# Patient Record
Sex: FEMALE | Race: WHITE | Employment: UNEMPLOYED | URBAN - METROPOLITAN AREA
[De-identification: names, ages, dates, MRNs, and addresses within clinical notes are randomized per-mention and may not be internally consistent; named-entity substitution may affect disease eponyms.]

---

## 2017-02-23 ENCOUNTER — TELEPHONE (OUTPATIENT)
Dept: FAMILY MEDICINE CLINIC | Age: 52
End: 2017-02-23

## 2017-03-20 DIAGNOSIS — F90.9 ATTENTION DEFICIT HYPERACTIVITY DISORDER (ADHD), UNSPECIFIED ADHD TYPE: ICD-10-CM

## 2017-03-20 RX ORDER — DEXTROAMPHETAMINE SACCHARATE, AMPHETAMINE ASPARTATE, DEXTROAMPHETAMINE SULFATE AND AMPHETAMINE SULFATE 2.5; 2.5; 2.5; 2.5 MG/1; MG/1; MG/1; MG/1
10 TABLET ORAL DAILY
Qty: 30 TABLET | Refills: 0 | Status: SHIPPED | OUTPATIENT
Start: 2017-03-20 | End: 2017-04-27 | Stop reason: SDUPTHER

## 2017-03-22 ENCOUNTER — TELEPHONE (OUTPATIENT)
Dept: FAMILY MEDICINE CLINIC | Age: 52
End: 2017-03-22

## 2017-03-27 ENCOUNTER — OFFICE VISIT (OUTPATIENT)
Dept: FAMILY MEDICINE CLINIC | Age: 52
End: 2017-03-27

## 2017-03-27 VITALS
SYSTOLIC BLOOD PRESSURE: 106 MMHG | WEIGHT: 139 LBS | HEART RATE: 60 BPM | OXYGEN SATURATION: 96 % | BODY MASS INDEX: 24.43 KG/M2 | DIASTOLIC BLOOD PRESSURE: 64 MMHG | TEMPERATURE: 97.7 F

## 2017-03-27 DIAGNOSIS — F41.9 ANXIETY: ICD-10-CM

## 2017-03-27 DIAGNOSIS — R07.9 CHEST PAIN, UNSPECIFIED TYPE: Primary | ICD-10-CM

## 2017-03-27 PROCEDURE — 99213 OFFICE O/P EST LOW 20 MIN: CPT | Performed by: FAMILY MEDICINE

## 2017-03-27 PROCEDURE — 93000 ELECTROCARDIOGRAM COMPLETE: CPT | Performed by: FAMILY MEDICINE

## 2017-03-27 RX ORDER — LORAZEPAM 0.5 MG/1
0.5 TABLET ORAL 2 TIMES DAILY PRN
Qty: 15 TABLET | Refills: 0 | Status: SHIPPED | OUTPATIENT
Start: 2017-03-27 | End: 2017-08-07 | Stop reason: SDUPTHER

## 2017-04-27 DIAGNOSIS — F90.9 ATTENTION DEFICIT HYPERACTIVITY DISORDER (ADHD), UNSPECIFIED ADHD TYPE: ICD-10-CM

## 2017-04-27 RX ORDER — DEXTROAMPHETAMINE SACCHARATE, AMPHETAMINE ASPARTATE, DEXTROAMPHETAMINE SULFATE AND AMPHETAMINE SULFATE 2.5; 2.5; 2.5; 2.5 MG/1; MG/1; MG/1; MG/1
10 TABLET ORAL DAILY
Qty: 30 TABLET | Refills: 0 | Status: SHIPPED | OUTPATIENT
Start: 2017-04-27 | End: 2017-08-07 | Stop reason: SDUPTHER

## 2017-05-02 ENCOUNTER — TELEPHONE (OUTPATIENT)
Dept: FAMILY MEDICINE CLINIC | Age: 52
End: 2017-05-02

## 2017-06-08 ENCOUNTER — HOSPITAL ENCOUNTER (OUTPATIENT)
Dept: CARDIOLOGY | Facility: CLINIC | Age: 52
Discharge: HOME | End: 2017-06-08
Attending: FAMILY MEDICINE | Admitting: FAMILY MEDICINE

## 2017-06-08 LAB
LV EF: 64 %
LVEF MODALITY: NORMAL

## 2017-06-28 ENCOUNTER — OFFICE VISIT (OUTPATIENT)
Dept: FAMILY MEDICINE CLINIC | Age: 52
End: 2017-06-28

## 2017-06-28 VITALS
OXYGEN SATURATION: 99 % | HEART RATE: 83 BPM | RESPIRATION RATE: 16 BRPM | DIASTOLIC BLOOD PRESSURE: 70 MMHG | BODY MASS INDEX: 24.43 KG/M2 | WEIGHT: 139 LBS | SYSTOLIC BLOOD PRESSURE: 120 MMHG

## 2017-06-28 DIAGNOSIS — R05.9 COUGH: ICD-10-CM

## 2017-06-28 DIAGNOSIS — R07.89 ATYPICAL CHEST PAIN: Primary | ICD-10-CM

## 2017-06-28 PROCEDURE — 99214 OFFICE O/P EST MOD 30 MIN: CPT | Performed by: FAMILY MEDICINE

## 2017-06-28 RX ORDER — LEVALBUTEROL TARTRATE 45 UG/1
1-2 AEROSOL, METERED ORAL EVERY 4 HOURS PRN
Qty: 1 INHALER | Refills: 3 | Status: SHIPPED | OUTPATIENT
Start: 2017-06-28 | End: 2019-05-06 | Stop reason: ALTCHOICE

## 2017-07-07 ENCOUNTER — TELEPHONE (OUTPATIENT)
Dept: FAMILY MEDICINE CLINIC | Age: 52
End: 2017-07-07

## 2017-07-25 ENCOUNTER — TELEPHONE (OUTPATIENT)
Dept: FAMILY MEDICINE CLINIC | Age: 52
End: 2017-07-25

## 2017-08-07 ENCOUNTER — TELEPHONE (OUTPATIENT)
Dept: FAMILY MEDICINE CLINIC | Age: 52
End: 2017-08-07

## 2017-08-07 ENCOUNTER — OFFICE VISIT (OUTPATIENT)
Dept: FAMILY MEDICINE CLINIC | Age: 52
End: 2017-08-07

## 2017-08-07 VITALS
RESPIRATION RATE: 16 BRPM | SYSTOLIC BLOOD PRESSURE: 104 MMHG | BODY MASS INDEX: 24.27 KG/M2 | OXYGEN SATURATION: 99 % | WEIGHT: 137 LBS | HEART RATE: 71 BPM | HEIGHT: 63 IN | DIASTOLIC BLOOD PRESSURE: 60 MMHG

## 2017-08-07 DIAGNOSIS — Z83.2 FAMILY HISTORY OF CLOTTING DISORDER: ICD-10-CM

## 2017-08-07 DIAGNOSIS — F90.9 ATTENTION DEFICIT HYPERACTIVITY DISORDER (ADHD), UNSPECIFIED ADHD TYPE: ICD-10-CM

## 2017-08-07 DIAGNOSIS — E55.9 VITAMIN D DEFICIENCY: ICD-10-CM

## 2017-08-07 DIAGNOSIS — F41.9 ANXIETY: ICD-10-CM

## 2017-08-07 DIAGNOSIS — Z00.00 ANNUAL PHYSICAL EXAM: Primary | ICD-10-CM

## 2017-08-07 LAB
A/G RATIO: 2.2 (ref 1.1–2.2)
ALBUMIN SERPL-MCNC: 5.2 G/DL (ref 3.4–5)
ALP BLD-CCNC: 57 U/L (ref 40–129)
ALT SERPL-CCNC: 18 U/L (ref 10–40)
ANION GAP SERPL CALCULATED.3IONS-SCNC: 17 MMOL/L (ref 3–16)
AST SERPL-CCNC: 20 U/L (ref 15–37)
BILIRUB SERPL-MCNC: 0.5 MG/DL (ref 0–1)
BUN BLDV-MCNC: 15 MG/DL (ref 7–20)
CALCIUM SERPL-MCNC: 9.6 MG/DL (ref 8.3–10.6)
CHLORIDE BLD-SCNC: 98 MMOL/L (ref 99–110)
CHOLESTEROL, TOTAL: 231 MG/DL (ref 0–199)
CO2: 24 MMOL/L (ref 21–32)
CREAT SERPL-MCNC: 0.6 MG/DL (ref 0.6–1.1)
GFR AFRICAN AMERICAN: >60
GFR NON-AFRICAN AMERICAN: >60
GLOBULIN: 2.4 G/DL
GLUCOSE BLD-MCNC: 94 MG/DL (ref 70–99)
HCT VFR BLD CALC: 40 % (ref 36–48)
HDLC SERPL-MCNC: 82 MG/DL (ref 40–60)
HEMOGLOBIN: 13.5 G/DL (ref 12–16)
LDL CHOLESTEROL CALCULATED: 127 MG/DL
MCH RBC QN AUTO: 30 PG (ref 26–34)
MCHC RBC AUTO-ENTMCNC: 33.7 G/DL (ref 31–36)
MCV RBC AUTO: 89.2 FL (ref 80–100)
PDW BLD-RTO: 13.2 % (ref 12.4–15.4)
PLATELET # BLD: 264 K/UL (ref 135–450)
PMV BLD AUTO: 9.4 FL (ref 5–10.5)
POTASSIUM SERPL-SCNC: 4.2 MMOL/L (ref 3.5–5.1)
RBC # BLD: 4.49 M/UL (ref 4–5.2)
SODIUM BLD-SCNC: 139 MMOL/L (ref 136–145)
TOTAL PROTEIN: 7.6 G/DL (ref 6.4–8.2)
TRIGL SERPL-MCNC: 109 MG/DL (ref 0–150)
TSH REFLEX FT4: 0.9 UIU/ML (ref 0.27–4.2)
VITAMIN D 25-HYDROXY: 23.1 NG/ML
VLDLC SERPL CALC-MCNC: 22 MG/DL
WBC # BLD: 3.7 K/UL (ref 4–11)

## 2017-08-07 PROCEDURE — 99396 PREV VISIT EST AGE 40-64: CPT | Performed by: FAMILY MEDICINE

## 2017-08-07 RX ORDER — DEXTROAMPHETAMINE SACCHARATE, AMPHETAMINE ASPARTATE, DEXTROAMPHETAMINE SULFATE AND AMPHETAMINE SULFATE 2.5; 2.5; 2.5; 2.5 MG/1; MG/1; MG/1; MG/1
10 TABLET ORAL DAILY
Qty: 90 TABLET | Refills: 0 | Status: SHIPPED | OUTPATIENT
Start: 2017-08-07 | End: 2017-12-13 | Stop reason: SDUPTHER

## 2017-08-07 RX ORDER — LORAZEPAM 0.5 MG/1
0.5 TABLET ORAL 2 TIMES DAILY PRN
Qty: 15 TABLET | Refills: 0 | Status: SHIPPED | OUTPATIENT
Start: 2017-08-07 | End: 2017-12-04 | Stop reason: SDUPTHER

## 2017-08-10 LAB
ACTIVATED PROTEIN C RESISTANCE: 3.79
APTT: 32 SEC (ref 24–35)
FACTOR V LEIDEN: NORMAL
FACTOR VIII ACTIVITY: 100 % (ref 56–191)
FACV SPECIMEN: NORMAL
HOMOCYSTEINE: 9 UMOL/L
PROTHROMBIN G20210A MUTATION: NEGATIVE
PT PCR SPECIMEN: NORMAL
THROMBOSIS INTERPRETATION: NORMAL

## 2017-08-11 ENCOUNTER — TELEPHONE (OUTPATIENT)
Dept: FAMILY MEDICINE CLINIC | Age: 52
End: 2017-08-11

## 2017-08-11 DIAGNOSIS — Z83.2 FAMILY HISTORY OF HYPERCOAGULABILITY: Primary | ICD-10-CM

## 2017-08-30 ENCOUNTER — HOSPITAL ENCOUNTER (OUTPATIENT)
Dept: OTHER | Age: 52
Discharge: HOME | End: 2017-08-30
Attending: FAMILY MEDICINE | Admitting: FAMILY MEDICINE

## 2017-08-30 DIAGNOSIS — Z83.2 FAMILY HISTORY OF HYPERCOAGULABILITY: ICD-10-CM

## 2017-08-31 ENCOUNTER — OFFICE VISIT (OUTPATIENT)
Dept: CARDIOLOGY CLINIC | Age: 52
End: 2017-08-31

## 2017-08-31 VITALS
SYSTOLIC BLOOD PRESSURE: 128 MMHG | DIASTOLIC BLOOD PRESSURE: 82 MMHG | BODY MASS INDEX: 24.1 KG/M2 | HEIGHT: 63 IN | WEIGHT: 136 LBS | HEART RATE: 66 BPM

## 2017-08-31 DIAGNOSIS — R94.39 ABNORMAL STRESS ELECTROCARDIOGRAM TEST: ICD-10-CM

## 2017-08-31 DIAGNOSIS — R07.9 CHEST PAIN, UNSPECIFIED TYPE: ICD-10-CM

## 2017-08-31 PROCEDURE — 99215 OFFICE O/P EST HI 40 MIN: CPT | Performed by: INTERNAL MEDICINE

## 2017-08-31 RX ORDER — METOPROLOL TARTRATE 50 MG/1
50 TABLET, FILM COATED ORAL ONCE
Qty: 1 TABLET | Refills: 0 | Status: SHIPPED | OUTPATIENT
Start: 2017-08-31 | End: 2019-05-06

## 2017-09-01 LAB
PROTEIN S ANTIGEN, FREE: 77 % (ref 55–123)
PROTEIN S ANTIGEN, TOTAL: 108 % (ref 63–126)

## 2017-09-05 ENCOUNTER — TELEPHONE (OUTPATIENT)
Dept: CARDIOLOGY CLINIC | Age: 52
End: 2017-09-05

## 2017-09-11 ENCOUNTER — TELEPHONE (OUTPATIENT)
Dept: CARDIOLOGY CLINIC | Age: 52
End: 2017-09-11

## 2017-12-04 DIAGNOSIS — F41.9 ANXIETY: ICD-10-CM

## 2017-12-04 DIAGNOSIS — F90.9 ATTENTION DEFICIT HYPERACTIVITY DISORDER (ADHD), UNSPECIFIED ADHD TYPE: ICD-10-CM

## 2017-12-04 RX ORDER — LORAZEPAM 0.5 MG/1
0.5 TABLET ORAL 2 TIMES DAILY PRN
Qty: 15 TABLET | Refills: 0 | Status: SHIPPED | OUTPATIENT
Start: 2017-12-04 | End: 2018-03-21 | Stop reason: SDUPTHER

## 2017-12-04 NOTE — TELEPHONE ENCOUNTER
Patient called and is requesting a refill on her Lorazepam. Please call patient when rx is sent to [facility] on [facility]. Thanks.

## 2017-12-13 DIAGNOSIS — F90.9 ATTENTION DEFICIT HYPERACTIVITY DISORDER (ADHD), UNSPECIFIED ADHD TYPE: ICD-10-CM

## 2017-12-13 RX ORDER — DEXTROAMPHETAMINE SACCHARATE, AMPHETAMINE ASPARTATE, DEXTROAMPHETAMINE SULFATE AND AMPHETAMINE SULFATE 2.5; 2.5; 2.5; 2.5 MG/1; MG/1; MG/1; MG/1
10 TABLET ORAL DAILY
Qty: 90 TABLET | Refills: 0 | Status: SHIPPED | OUTPATIENT
Start: 2017-12-13 | End: 2018-03-21 | Stop reason: SDUPTHER

## 2017-12-13 NOTE — TELEPHONE ENCOUNTER
Last Seen: 08.07.17  Last Filled: 08/07/17  Last UDS: 09.15.16  OARRS Run On: 12.06.17  Med Agreement Signed On:09.20.16  Next Appointment: none

## 2018-01-31 ENCOUNTER — OFFICE VISIT (OUTPATIENT)
Dept: ORTHOPEDIC SURGERY | Age: 53
End: 2018-01-31

## 2018-01-31 VITALS
BODY MASS INDEX: 24.1 KG/M2 | DIASTOLIC BLOOD PRESSURE: 68 MMHG | HEIGHT: 63 IN | SYSTOLIC BLOOD PRESSURE: 121 MMHG | WEIGHT: 136.02 LBS | HEART RATE: 59 BPM

## 2018-01-31 DIAGNOSIS — S46.012A ROTATOR CUFF STRAIN, LEFT, INITIAL ENCOUNTER: ICD-10-CM

## 2018-01-31 DIAGNOSIS — M25.512 LEFT SHOULDER PAIN, UNSPECIFIED CHRONICITY: Primary | ICD-10-CM

## 2018-01-31 PROCEDURE — G8484 FLU IMMUNIZE NO ADMIN: HCPCS | Performed by: FAMILY MEDICINE

## 2018-01-31 PROCEDURE — G8427 DOCREV CUR MEDS BY ELIG CLIN: HCPCS | Performed by: FAMILY MEDICINE

## 2018-01-31 PROCEDURE — 99214 OFFICE O/P EST MOD 30 MIN: CPT | Performed by: FAMILY MEDICINE

## 2018-01-31 PROCEDURE — 3014F SCREEN MAMMO DOC REV: CPT | Performed by: FAMILY MEDICINE

## 2018-01-31 PROCEDURE — 1036F TOBACCO NON-USER: CPT | Performed by: FAMILY MEDICINE

## 2018-01-31 PROCEDURE — 3017F COLORECTAL CA SCREEN DOC REV: CPT | Performed by: FAMILY MEDICINE

## 2018-01-31 PROCEDURE — G8420 CALC BMI NORM PARAMETERS: HCPCS | Performed by: FAMILY MEDICINE

## 2018-01-31 RX ORDER — IBUPROFEN 600 MG/1
600 TABLET ORAL EVERY 6 HOURS PRN
Qty: 120 TABLET | Refills: 3 | Status: SHIPPED | OUTPATIENT
Start: 2018-01-31 | End: 2019-05-06 | Stop reason: ALTCHOICE

## 2018-01-31 RX ORDER — METHYLPREDNISOLONE 4 MG/1
TABLET ORAL
Qty: 21 KIT | Refills: 0 | Status: SHIPPED | OUTPATIENT
Start: 2018-01-31 | End: 2018-04-25

## 2018-01-31 NOTE — PROGRESS NOTES
Chief Complaint    Shoulder Pain (OPSP LEFT SHOULDER)    Initial consultation recurrent left shoulder pain with mild weakness    History of Present Illness: Brandy Lozoya is a 47 y.o. 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 Her symptoms began after she was working out with a substitute  who was attempting to get her to do pull-ups and was hanging from a bar on 12/20/2017 when she noticed a mild straining laterally involving her left shoulder. There is no definitive pop or crack. Her past orthopedic history is remarkable for a previously documented partial thickness undersurface tear to the supraspinatus which was documented on MRI back in 2014 which was successfully rehabilitated. She did have some capsulitis notable at that time as well. She did not have a great deal of pain the day of her work on on 12/20/2017 with the following day she began notice the soreness which has not improved which is causing her some concern. She states that she will have fleeting pain with reaching away in an abducted position or with repetitive overhead activity but is not consistent pain but she does have some weakness. She describes her pain symptoms as sharp at 6-7 out of 10 with sudden abduction but then rapidly diminished after about 20-30 seconds. She has been having to modify her work up. Reports no locking catching or instability symptoms. No neck pain or radicular symptoms. She has been attempting to do her stretching and exercise program this definitely been modifying her workouts. No night pain. She is seen today for orthopedic and sports consultation with imaging.       Medical History  Patient's medications, allergies, past medical, surgical, social and family histories were reviewed and updated as appropriate. Review of Systems  Relevant review of systems reviewed on 1/31/2018 and available in the patient's chart under the medial tab. Vital Signs  Vitals:    01/31/18 0948   BP: 121/68   Pulse: 59         General Exam:   Constitutional: Patient is adequately groomed with no evidence of malnutrition  DTRs: Deep tendon reflexes are intact  Mental Status: The patient is oriented to time, place and person. The patient's mood and affect are appropriate. Lymphatic: The lymphatic examination bilaterally reveals all areas to be without enlargement or induration. Vascular: Examination reveals no swelling or calf tenderness. Peripheral pulses are palpable and 2+. Neurological: The patient has good coordination. There is no weakness or sensory deficit. Shoulder Examination    Inspection:  There is no high-grade deformity atrophy or effusion. Palpation:  She does have mild tenderness over the greater tuberosity minimally over the posterior cuff. Minimal tenderness over the a.c. joint. No bicipital tendon tenderness. Rang of Motion:  She does have near full range of motion about the shoulder but does have some discomfort over the greater tuberosity with abduction beyond 150 and forward flexion beyond 155-160. Some pulling with extreme internal and external rotation bilaterally. Strength:  She does have some mild weakness at 4-4+ out of 5 with supra and infraspinatus testing. 5 out of 5 subscap testing. Special Tests:  Negative drop and liftoff testing. Minimal discomfort with impingement testing. She does have some pain reproduced with supraspinatus testing. Negative speed's and labral testing. No instability. Negative apprehension testing. Negative screening cervical testing. Skin: There are no rashes, ulcerations or lesions. Distal motor sensory and vascular exam is intact. Gait: Fluid smooth gait.     Reflexes:

## 2018-02-01 ENCOUNTER — HOSPITAL ENCOUNTER (OUTPATIENT)
Dept: PHYSICAL THERAPY | Age: 53
Discharge: HOME | End: 2018-02-28
Attending: FAMILY MEDICINE | Admitting: FAMILY MEDICINE

## 2018-02-01 ENCOUNTER — HOSPITAL ENCOUNTER (OUTPATIENT)
Dept: PHYSICAL THERAPY | Age: 53
Discharge: HOME | End: 2018-01-31
Admitting: FAMILY MEDICINE

## 2018-02-06 ENCOUNTER — HOSPITAL ENCOUNTER (OUTPATIENT)
Dept: PHYSICAL THERAPY | Age: 53
Discharge: HOME | End: 2018-02-07
Admitting: FAMILY MEDICINE

## 2018-02-06 NOTE — PLAN OF CARE
[facility], [address]  Phone: [phone]  Fax [phone]     Physical Therapy Certification    Dear Referring Practitioner: Dr. Norman Burgess,    We had the pleasure of evaluating the following patient for physical therapy services at [address] [place] [address]. A summary of our findings can be found in the initial assessment below. This includes our plan of care. If you have any questions or concerns regarding these findings, please do not hesitate to contact me at the office phone number checked above. Thank you for the referral.       Physician Signature:_______________________________Date:__________________  By signing above (or electronic signature), therapists plan is approved by physician    Patient: Ani Wofle   : 1965   MRN: [id number]  Referring Physician: Referring Practitioner: Dr. Norman Burgess      Evaluation Date: 2018      Medical Diagnosis Information:  Diagnosis: L shoulder pain/ RTC strain M25.512,S46.012D   Treatment Diagnosis: L shoulder pain                                          Insurance information: PT Insurance Information:  60 visits/yr    Precautions/ Contra-indications: migraines,prev Lshoulder injury   Latex Allergy:  [x]NO      []YES  Preferred Language for Healthcare:   [x]English       []other:    SUBJECTIVE: Patient reports she was working out with a  in 2017 and was hanging from a bar several times. Pt reports the next day she had increased L shoulder pain. She has a hx of a L RTC tear from . She had PT and a cortizone injectionx2 and it got better. Pt reports she saw MD and is now on prednisone. Pt reports she has no pain at rest but with certain mvmts.       Relevant Medical History: hx of RTC tear   Functional Disability Index:PT G-Codes  Functional Assessment Tool Used: quick dash   Score: 5%  Functional Limitation: Carrying, and observation. Intake form has been scanned into medical record. Patient has been instructed to contact their primary care physician regarding ROS issues if not already being addressed at this time. Co-morbidities/Complexities (which will affect course of rehabilitation):   [x]None           Arthritic conditions   []Rheumatoid arthritis (M05.9)  []Osteoarthritis (M19.91)   Cardiovascular conditions   []Hypertension (I10)  []Hyperlipidemia (E78.5)  []Angina pectoris (I20)  []Atherosclerosis (I70)   Musculoskeletal conditions   []Disc pathology   []Congenital spine pathologies   []Prior surgical intervention  []Osteoporosis (M81.8)  []Osteopenia (M85.8)   Endocrine conditions   []Hypothyroid (E03.9)  []Hyperthyroid Gastrointestinal conditions   []Constipation (G48.84)   Metabolic conditions   []Morbid obesity (E66.01)  []Diabetes type 1(E10.65) or 2 (E11.65)   []Neuropathy (G60.9)     Pulmonary conditions   []Asthma (J45)  []Coughing   []COPD (J44.9)   Psychological Disorders  []Anxiety (F41.9)  []Depression (F32.9)   []Other:   []Other:          Barriers to/and or personal factors that will affect rehab potential:              []Age  []Sex              []Motivation/Lack of Motivation                        []Co-Morbidities              []Cognitive Function, education/learning barriers              []Environmental, home barriers              []profession/work barriers  []past PT/medical experience  []other:  Justification:      Falls Risk Assessment (30 days):   [x] Falls Risk assessed and no intervention required. [] Falls Risk assessed and Patient requires intervention due to being higher risk   TUG score (>12s at risk):     [] Falls education provided, including       G-Codes:  PT G-Codes  Functional Assessment Tool Used: quick dash   Score: 5%  Functional Limitation: Carrying, moving and handling objects  Carrying, Moving and Handling Objects Current Status ():  At least 1 percent but less than 20

## 2018-02-20 ENCOUNTER — HOSPITAL ENCOUNTER (OUTPATIENT)
Dept: PHYSICAL THERAPY | Age: 53
Discharge: HOME | End: 2018-02-21
Admitting: FAMILY MEDICINE

## 2018-02-28 ENCOUNTER — OFFICE VISIT (OUTPATIENT)
Dept: ORTHOPEDIC SURGERY | Age: 53
End: 2018-02-28

## 2018-02-28 ENCOUNTER — HOSPITAL ENCOUNTER (OUTPATIENT)
Dept: PHYSICAL THERAPY | Age: 53
Discharge: HOME | End: 2018-03-01
Admitting: FAMILY MEDICINE

## 2018-02-28 VITALS
WEIGHT: 136.02 LBS | SYSTOLIC BLOOD PRESSURE: 108 MMHG | HEIGHT: 63 IN | HEART RATE: 79 BPM | DIASTOLIC BLOOD PRESSURE: 66 MMHG | BODY MASS INDEX: 24.1 KG/M2

## 2018-02-28 DIAGNOSIS — M25.512 CHRONIC LEFT SHOULDER PAIN: ICD-10-CM

## 2018-02-28 DIAGNOSIS — G89.29 CHRONIC LEFT SHOULDER PAIN: ICD-10-CM

## 2018-02-28 DIAGNOSIS — S46.012D STRAIN OF LEFT ROTATOR CUFF CAPSULE, SUBSEQUENT ENCOUNTER: Primary | ICD-10-CM

## 2018-02-28 PROCEDURE — 99213 OFFICE O/P EST LOW 20 MIN: CPT | Performed by: FAMILY MEDICINE

## 2018-02-28 PROCEDURE — G8484 FLU IMMUNIZE NO ADMIN: HCPCS | Performed by: FAMILY MEDICINE

## 2018-02-28 PROCEDURE — 20610 DRAIN/INJ JOINT/BURSA W/O US: CPT | Performed by: FAMILY MEDICINE

## 2018-02-28 PROCEDURE — G8420 CALC BMI NORM PARAMETERS: HCPCS | Performed by: FAMILY MEDICINE

## 2018-02-28 PROCEDURE — 3014F SCREEN MAMMO DOC REV: CPT | Performed by: FAMILY MEDICINE

## 2018-02-28 PROCEDURE — 1036F TOBACCO NON-USER: CPT | Performed by: FAMILY MEDICINE

## 2018-02-28 PROCEDURE — G8427 DOCREV CUR MEDS BY ELIG CLIN: HCPCS | Performed by: FAMILY MEDICINE

## 2018-02-28 PROCEDURE — 3017F COLORECTAL CA SCREEN DOC REV: CPT | Performed by: FAMILY MEDICINE

## 2018-02-28 RX ORDER — NABUMETONE 750 MG/1
750 TABLET, FILM COATED ORAL 2 TIMES DAILY
Qty: 60 TABLET | Refills: 3 | Status: SHIPPED | OUTPATIENT
Start: 2018-02-28 | End: 2019-05-06

## 2018-02-28 NOTE — FLOWSHEET NOTE
[facility], [address] [place]  Phone: [phone]  Fax [phone]      Physical Therapy Daily Treatment Note  Date:  2018    Patient Name:  Jose A Grant    :  1965  MRN: [id number]  Restrictions/Precautions:    Medical/Treatment Diagnosis Information:  Diagnosis: L shoulder pain/ RTC strain M25.512,S46.012D  Treatment Diagnosis: L shoulder pain   Insurance/Certification information:  PT Insurance Information:  60 visits/yr  Physician Information:  Referring Practitioner: Dr. Dinh Galaviz of care signed (Y/N): sent;POC expires 3/6    Date of Patient follow up with Physician: 18    G-Code (if applicable):      Date G-Code Applied:  Quick dash 5% 18       Progress Note: []  Yes  [x]  No  Next due by: Visit #10      Latex Allergy:  [x]NO      []YES  Preferred Language for Healthcare:   [x]English       []other:    Visit # Insurance Allowable Requires auth   4 60    [x]no        []yes:     Pain level: 0-5/10 L shoulder      SUBJECTIVE: pt reports she is no longer having shooting pains. But still has pain with shoulder behind and pushing up on arm. Pt reports she just saw MD and had a cortizone injection. Pt reports she has returned to working out but not doing as much weight as she had been doing     OBJECTIVE:   Observation:   Test measurements:      RESTRICTIONS/PRECAUTIONS:  MRI from ;   Partial thickness, undersurface tear of the anterior aspect of the supraspinatus footprint    measuring 6mm x 4mm in dimension. 2. Anterior capsular thickening of the glenohumeral joint consistent with dry capsulitis.          Exercises/Interventions:   Therapeutic Ex/ NMR  Sets/rep comments   Prone abd  2# 2x10 hep   Prone LT active  3x10 Hep    Prone ER 3x10    TB shoulder IR/ER 90-90  GTB 3x10  Hep 18   TB LT  BTB 3x10  hep   Prone shoulder flex  0# 2x10     Prone ext with TB  GTB 2x10     Sleeper stretch 10\"x10  Hep 2/20/18    Standing tricep ext with TB  GTB 3x10     Standing shoulder flex against wall with lift off and TB for scap retraction  OTB3\" 2x10     triped RS with R UE lift /oscillation with therabar     Supine tricep ext  3# 3x10    SL shoulder ER  2# 3x10     LT iso with table  6\"2x10     Standing shoulder ext with TB  GTB 2x10                              Manual Intervention     STM to L supraspinatus  7 min     Post glide/PROM flexion 6'    RS in triped :                        NMR re-education                                                 Therapeutic Exercise and NMR EXR  [] (16045) Provided verbal/tactile cueing for activities related to strengthening, flexibility, endurance, ROM  for improvements in scapular, scapulothoracic and UE control with self care, reaching, carrying, lifting, house/yardwork, driving/computer work.    [] (72741) Provided verbal/tactile cueing for activities related to improving balance, coordination, kinesthetic sense, posture, motor skill, proprioception  to assist with  scapular, scapulothoracic and UE control with self care, reaching, carrying, lifting, house/yardwork, driving/computer work. Therapeutic Activities:    [] (39750 or 83084) Provided verbal/tactile cueing for activities related to improving balance, coordination, kinesthetic sense, posture, motor skill, proprioception and motor activation to allow for proper function of scapular, scapulothoracic and UE control with self care, carrying, lifting, driving/computer work.      Home Exercise Program:    [x] (96317) Reviewed/Progressed HEP activities related to strengthening, flexibility, endurance, ROM of scapular, scapulothoracic and UE control with self care, reaching, carrying, lifting, house/yardwork, driving/computer work  [] (20259) Reviewed/Progressed HEP activities related to improving balance, coordination, kinesthetic sense, posture, motor skill, proprioception of scapular, scapulothoracic and functional goals listed. [] Progression is slowed due to complexities listed. [] Progression has been slowed due to co-morbidities. [x] Plan just implemented, too soon to assess goals progression  [] Other:     ASSESSMENT: . Decreasing frequency of pain, still tight shoulder IR at 90°.  Improving scap stab     Treatment/Activity Tolerance:  [x] Patient tolerated treatment well [] Patient limited by fatique  [] Patient limited by pain  [] Patient limited by other medical complications  [] Other:     Prognosis: [x] Good [] Fair  [] Poor    Patient Requires Follow-up: [x] Yes  [] No    PLAN:   [x] Continue per plan of care [] Alter current plan (see comments)  [] Plan of care initiated [] Hold pending MD visit [] Discharge    Electronically signed by: [place] [id number]

## 2018-03-01 ENCOUNTER — HOSPITAL ENCOUNTER (OUTPATIENT)
Dept: PHYSICAL THERAPY | Age: 53
Discharge: HOME | End: 2018-03-31
Attending: FAMILY MEDICINE | Admitting: FAMILY MEDICINE

## 2018-03-01 NOTE — PROGRESS NOTES
Ongoing discomfort over the greater tuberosity with abduction beyond 150 and forward flexion beyond 155-160. Some pulling with extreme internal and external rotation bilaterally. Strength:  She does have some mild weakness at 4-4+ out of 5 with supra and infraspinatus testing. 5 out of 5 subscap testing. Special Tests:  Negative drop and liftoff testing. Minimal discomfort with impingement testing. She rates this at 3-4/10. She does have less pain reproduced with supraspinatus testing. Negative speed's and labral testing. No instability. Negative apprehension testing. Negative screening cervical testing. Skin: There are no rashes, ulcerations or lesions. Distal motor sensory and vascular exam is intact. Gait: Fluid smooth gait. Reflexes:  Symmetrically preserved. Additional Comments:        Additional Examinations:  Contralateral Exam: Examination of the right shoulder reveals no atrophy or deformity. The skin is warm and dry. Range of motion is within normal limits. There is no focal tenderness with palpation. No AC joint tenderness. Negative Neer's and Rose-Nate exams. Strength is graded 5/5 throughout. Right Upper Extremity:  Examination of the right upper extremity does not show any tenderness, deformity or injury. Range of motion is unremarkable. There is no gross instability. There are no rashes, ulcerations or lesions. Strength and tone are normal.  Left Upper Extremity: Examination of the left upper extremity does not show any tenderness, deformity or injury. Range of motion is unremarkable. There is no gross instability. There are no rashes, ulcerations or lesions. Strength and tone are normal.         Diagnostic Test Findings:            Assessment:  #1.   Nearly 10 weeks status post moderately improved recurrent left shoulder rotator cuff strain with shoulder pain with previously documented partial thickness undersurface tear supraspinatus

## 2018-03-08 ENCOUNTER — HOSPITAL ENCOUNTER (OUTPATIENT)
Dept: PHYSICAL THERAPY | Age: 53
Discharge: HOME | End: 2018-03-09
Admitting: FAMILY MEDICINE

## 2018-03-08 NOTE — FLOWSHEET NOTE
[facility],  [address] [place]  Phone: [phone]  Fax [phone]    ATHLETIC TRAINING [address] [facility]  Date:  3/8/2018    Patient Name:  Jack Haro    :  1965  MRN: [id number]  Restrictions/Precautions:  MRI from   Partial thickness, undersurface tear of the anterior aspect of the supraspinatus footprint    measuring 6mm x 4mm in dimension. 2. Anterior capsular thickening of the glenohumeral joint consistent with dry capsulitis. Medical/Treatment Diagnosis Information:  ·  L shoulder pain/ RTC strain  ·  L shoulder pain  Physician Information:   Dr. Sindi Fernando Post-op  2wks    4 wks 6 wks 8 wks   3wks 6 wks 9 wks 12 wks                        Activity Log                                                          DOS/DOI:                                                         Date: 2/20/18 2/28/18 3/8/18   ATC Commun  Pt had cortisone injection today, so shld is sore          Plyoback      Chop                           Chest                           ER Flip            Long/Short lever  Flex. Scap.                                   ABd.             punch            Body/Cardio Blade Flex/Ext                                      IR/ER                                      ABd/ADd            Push-up      Plus                             Wall                           Table                          Floor            Ball on Wall                  Tramp       OVL pullbacks R/L 10x  OVL no money 10x3\" OVL pullbacks R/L 10x  OVL no money 20x3\" OVL pullbacks R/L 15x  OVL no money 20x3\"  OVL lat wall walks 2x   Theraband    Rows/Ext                            IR                            ER                            No money                            Horiz.  ABd                            Biceps                            Triceps Punches            Cable Column   Rows 30# 2x10 30# (B) 2x10 30# 3x10                              Ext. 20# 2x10 20# (B) 2x10 20# 3x10                              Lat. Pulldown 50# 2x10 50# (B) 2x10 50# 3x10                              Biceps                                 Triceps 30# 2x10 30# (B) 2x10 30# 3x10         Modality  CP 15' declined   Initials KRT JLW KRT   Time spent with PT assistant 10'  13'

## 2018-03-08 NOTE — FLOWSHEET NOTE
[facility], [address]  Phone: [phone]  Fax [phone]      Physical Therapy Daily Treatment Note  Date:  3/8/2018    Patient Name:  Sterling Lopez    :  1965  MRN: [id number]  Restrictions/Precautions:    Medical/Treatment Diagnosis Information:  Diagnosis: L shoulder pain/ RTC strain M25.512,S46.012D  Treatment Diagnosis: L shoulder pain   Insurance/Certification information:  PT Insurance Information:  60 visits/yr  Physician Information:  Referring Practitioner: Dr. Mary Foy of care signed (Y/N): sent;POC expires 3/6    Date of Patient follow up with Physician: 18    G-Code (if applicable):      Date G-Code Applied:  Quick dash 5% 18       Progress Note: []  Yes  [x]  No  Next due by: Visit #10      Latex Allergy:  [x]NO      []YES  Preferred Language for Healthcare:   [x]English       []other:    Visit # Insurance Allowable Requires auth   5 60    [x]no        []yes:     Pain level: 0-3/10 L shoulder      SUBJECTIVE: pt reports she feels like the cortizone injection helped. She gets intermittent quick pain if she puts her arm into an awkward position. She is working out with her  and he is babying her shoulder   OBJECTIVE:   Observation:   Test measurements:      RESTRICTIONS/PRECAUTIONS:  MRI from 2014;   Partial thickness, undersurface tear of the anterior aspect of the supraspinatus footprint    measuring 6mm x 4mm in dimension. 2. Anterior capsular thickening of the glenohumeral joint consistent with dry capsulitis.          Exercises/Interventions:   Therapeutic Ex/ NMR  Sets/rep comments   Prone abd  2# 2x10 hep   Prone LT active  3x10 Hep    Prone ER 3x10    TB shoulder IR/ER 90-90    Hep 18   TB LT sitting on SB  BTB 3x10  hep   Prone shoulder flex on SB  0# 2x10     Prone ext on SB   3# 2x10     Sleeper stretch SL  15\"x8  Hep 18    Standing tricep ext self care, reaching, carrying, lifting, house/yardwork, driving/computer work      Manual Treatments:  PROM / STM / Oscillations-Mobs:  G-I, II, III, IV (PA's, Inf., Post.)  [x] (85355) Provided manual therapy to mobilize soft tissue/joints of cervical/CT, scapular GHJ and UE for the purpose of modulating pain, promoting relaxation,  increasing ROM, reducing/eliminating soft tissue swelling/inflammation/restriction, improving soft tissue extensibility and allowing for proper ROM for normal function with self care, reaching, carrying, lifting, house/yardwork, driving/computer work    Modalities:  Pt declined     Charges:  Timed Code Treatment Minutes: 53   Total Treatment Minutes: 53     [] EVAL (LOW) 84099 (typically 20 minutes face-to-face)  [] EVAL (MOD) 65880 (typically 30 minutes face-to-face)  [] EVAL (HIGH) 36433 (typically 45 minutes face-to-face)  [] RE-EVAL     [x] TN(67045) x  2   [] IONTO  [x] NMR (03003) x  1   [] VASO  [x] Manual (94102) x  1    [] Other:  [] TA x       [] Mech Traction (92068)  [] ES(attended) (21224)      [] ES (un) (93633):     GOALS:Therpist goals for Patient:   Short Term Goals: To be achieved in: 2 weeks  1. Independent in HEP and progression per patient tolerance, in order to prevent re-injury. Met   2. Patient will have a decrease in pain to facilitate improvement in movement, function, and ADLs as indicated by Functional Deficits. met     Long Term Goals: To be achieved in:4 weeks  1. Disability index score of 3% or less for the [facility] to assist with reaching prior level of function. 2. Patient will demonstrate an increase in Strength to L MT and LT 4  to allow for proper functional mobility as indicated by patients Functional Deficits.    3. Patient will return to being able to reach and use L UE for  functional activities without increased symptoms or restriction. : improving       Progression Towards Functional goals:  [x] Patient is progressing as expected towards functional goals listed. [] Progression is slowed due to complexities listed. [] Progression has been slowed due to co-morbidities. [] Plan just implemented, too soon to assess goals progression  [] Other:     ASSESSMENT: . Improving scap stab, able to progress scap stab strengthening.  Still tight post capsule but improving      Treatment/Activity Tolerance:  [x] Patient tolerated treatment well [] Patient limited by fatique  [] Patient limited by pain  [] Patient limited by other medical complications  [] Other:     Prognosis: [x] Good [] Fair  [] Poor    Patient Requires Follow-up: [x] Yes  [] No    PLAN:   [x] Continue per plan of care [] Alter current plan (see comments)  [] Plan of care initiated [] Hold pending MD visit [] Discharge    Electronically signed by: [place] [id number]

## 2018-03-19 ENCOUNTER — HOSPITAL ENCOUNTER (OUTPATIENT)
Dept: PHYSICAL THERAPY | Age: 53
Discharge: HOME | End: 2018-03-20
Admitting: FAMILY MEDICINE

## 2018-03-19 NOTE — FLOWSHEET NOTE
[facility], [address] [place]  Phone: [phone]  Fax [phone]  Date:  3/19/2018    Patient Name:  Ani Wolfe    :  1965  MRN: [id number]  Restrictions/Precautions:  MRI from   Partial thickness, undersurface tear of the anterior aspect of the supraspinatus footprint    measuring 6mm x 4mm in dimension. 2. Anterior capsular thickening of the glenohumeral joint consistent with dry capsulitis. Medical/Treatment Diagnosis Information:  ·  L shoulder pain/ RTC strain  ·  L shoulder pain  Physician Information:   Dr. Perez Johansen Post-op  2wks    4 wks 6 wks 8 wks   3wks 6 wks 9 wks 12 wks                        Activity Log                                                          DOS/DOI:                                                         Date: 2/28/18 3/8/18 3/19/18   ATC Commun Pt had cortisone injection today, so shld is sore           Plyoback      Chop                           Chest                           ER Flip            Long/Short lever  Flex. Scap.                                   ABd.             punch            Body/Cardio Blade Flex/Ext                                      IR/ER                                      ABd/ADd            Push-up      Plus                             Wall                           Table                          Floor            Ball on Wall                  Tramp       OVL pullbacks R/L 10x  OVL no money 20x3\" OVL pullbacks R/L 15x  OVL no money 20x3\"  OVL lat wall walks 2x OVL3D pull  R/L 5x  OVL no money 20x3\"  OVL lat wall walks 2x   Theraband    Rows/Ext                            IR                            ER                            No money                            Horiz.  ABd                            Biceps                            Triceps

## 2018-03-19 NOTE — FLOWSHEET NOTE
[facility], [address] [place] [address] [place]  Phone: [phone]  Fax [phone]      Physical Therapy Daily Treatment Note  Date:  3/19/2018    Patient Name:  Ani Wolfe    :  1965  MRN: [id number]  Restrictions/Precautions:    Medical/Treatment Diagnosis Information:  Diagnosis: L shoulder pain/ RTC strain M25.512,S46.012D  Treatment Diagnosis: L shoulder pain   Insurance/Certification information:  PT Insurance Information:  60 visits/yr  Physician Information:  Referring Practitioner: Dr. Faisal Powers of care signed (Y/N): sent;POC expires 3/6    Date of Patient follow up with Physician: 18    G-Code (if applicable):      Date G-Code Applied:  Quick dash 5% 18       Progress Note: []  Yes  [x]  No  Next due by: Visit #10      Latex Allergy:  [x]NO      []YES  Preferred Language for Healthcare:   [x]English       []other:    Visit # Insurance Allowable Requires auth   6 60    [x]no        []yes:     Pain level:0/10 L shoulder      SUBJECTIVE: Pt reports her shoulder has been good. She has not been having any pain since last visit. OBJECTIVE:   Observation:   Test measurements:      RESTRICTIONS/PRECAUTIONS:  MRI from ;   Partial thickness, undersurface tear of the anterior aspect of the supraspinatus footprint    measuring 6mm x 4mm in dimension. 2. Anterior capsular thickening of the glenohumeral joint consistent with dry capsulitis.          Exercises/Interventions:   Therapeutic Ex/ NMR  Sets/rep comments   Prone abd  2# 2x10 hep   Prone LT active  3x10 Hep    Prone ER on SB  2# 2x10    TB shoulder IR/ER 90-90    Hep 18   TB LT sitting on SB  BTB 3x10  hep   Prone shoulder flex on SB  2# 2x10     Prone ext on SB   3# 2x10     Sleeper stretch SL  15\"x8  Hep 18    Standing tricep ext with TB  GTB 3x10     Standing shoulder flex against wall with lift off and TB for scap retraction  OTB3\"

## 2018-03-20 ENCOUNTER — TELEPHONE (OUTPATIENT)
Dept: INTERNAL MEDICINE CLINIC | Age: 53
End: 2018-03-20

## 2018-03-20 DIAGNOSIS — F41.9 ANXIETY: ICD-10-CM

## 2018-03-20 DIAGNOSIS — F90.9 ATTENTION DEFICIT HYPERACTIVITY DISORDER (ADHD), UNSPECIFIED ADHD TYPE: ICD-10-CM

## 2018-03-20 NOTE — TELEPHONE ENCOUNTER
Request refill on    LOV: 8-17-17 CPE    amphetamine-dextroamphetamine (ADDERALL, 10MG,) 10 MG tablet    LORazepam (ATIVAN) 0.5 MG tablet    Call Pt with question/ when ready please.

## 2018-03-21 RX ORDER — DEXTROAMPHETAMINE SACCHARATE, AMPHETAMINE ASPARTATE, DEXTROAMPHETAMINE SULFATE AND AMPHETAMINE SULFATE 2.5; 2.5; 2.5; 2.5 MG/1; MG/1; MG/1; MG/1
10 TABLET ORAL DAILY
Qty: 90 TABLET | Refills: 0 | Status: SHIPPED | OUTPATIENT
Start: 2018-03-21 | End: 2019-05-06

## 2018-03-21 RX ORDER — LORAZEPAM 0.5 MG/1
0.5 TABLET ORAL 2 TIMES DAILY PRN
Qty: 15 TABLET | Refills: 0 | Status: SHIPPED | OUTPATIENT
Start: 2018-03-21 | End: 2018-04-20

## 2018-03-21 NOTE — TELEPHONE ENCOUNTER
Last seen 8/7/17  Last filled 12/13/17-adderall  Last filled 12/4/17-ativan  OARRS today  rx pending

## 2018-03-22 ENCOUNTER — HOSPITAL ENCOUNTER (OUTPATIENT)
Dept: PHYSICAL THERAPY | Age: 53
Discharge: HOME | End: 2018-03-23
Admitting: FAMILY MEDICINE

## 2018-03-22 NOTE — FLOWSHEET NOTE
[facility], [address] [place]  Phone: [phone]  Fax [phone]    ATHLETIC TRAINING [address]  Date:  3/22/2018    Patient Name:  Henri Patel    :  1965  MRN: [id number]  Restrictions/Precautions:  MRI from   Partial thickness, undersurface tear of the anterior aspect of the supraspinatus footprint    measuring 6mm x 4mm in dimension. 2. Anterior capsular thickening of the glenohumeral joint consistent with dry capsulitis. Medical/Treatment Diagnosis Information:  ·  L shoulder pain/ RTC strain  ·  L shoulder pain  Physician Information:   Dr. Jane Estes      Weeks Post-op  2wks    4 wks 6 wks 8 wks   3wks 6 wks 9 wks 12 wks                        Activity Log                                                          DOS/DOI:                                                         Date: 3/8/18 3/19/18 3/22/18   ATC Commun: R handed            [place]ack      Chop                           Chest                           ER Flip            Long/Short lever  Flex. OVL flex lift 10x                                Scap.                                   ABd.             punch            Body/Cardio Blade Flex/Ext                                      IR/ER                                      ABd/ADd            Push-up      Plus                             Wall                           Table                          Floor            Ball on Wall                  Tramp       OVL pullbacks R/L 15x  OVL no money 20x3\"  OVL lat wall walks 2x OVL3D pull  R/L 5x  OVL no money 20x3\"  OVL lat wall walks 2x OVL 3D pull  R/L 5x  OVL no money 20x3\"  OVL lat wall walks 5x   Theraband    Rows/Ext                            IR                            ER                            No money                            Horiz.  ABd                            Biceps                            Triceps
against wall with lift off and TB for scap retraction       90-90 wall taps      Supine tricep ext  4# 3x10    SL shoulder ER       Wall bicep stretch       Body blade ER/ diagonal D2  20\"x3/ 2x8     Behind the back IR with TB  BTB 10\"x10     Inverted BOSU plank on elbows       TB D1  And D2 standing  RTB from floor  2x10 ea     Table push up with SB  3x10    Prone PNF D2 with resistance  2x8     Manual Intervention     STM to L bicep and supraspinatus  5 min     Post glide/PROM IR 5'                             NMR re-education                                                 Therapeutic Exercise and NMR EXR  [x] (83813) Provided verbal/tactile cueing for activities related to strengthening, flexibility, endurance, ROM  for improvements in scapular, scapulothoracic and UE control with self care, reaching, carrying, lifting, house/yardwork, driving/computer work. [x] (27344) Provided verbal/tactile cueing for activities related to improving balance, coordination, kinesthetic sense, posture, motor skill, proprioception  to assist with  scapular, scapulothoracic and UE control with self care, reaching, carrying, lifting, house/yardwork, driving/computer work. Therapeutic Activities:    [] (89729 or 90842) Provided verbal/tactile cueing for activities related to improving balance, coordination, kinesthetic sense, posture, motor skill, proprioception and motor activation to allow for proper function of scapular, scapulothoracic and UE control with self care, carrying, lifting, driving/computer work.      Home Exercise Program:    [x] (13519) Reviewed/Progressed HEP activities related to strengthening, flexibility, endurance, ROM of scapular, scapulothoracic and UE control with self care, reaching, carrying, lifting, house/yardwork, driving/computer work  [] (07669) Reviewed/Progressed HEP activities related to improving balance, coordination, kinesthetic sense, posture, motor skill, proprioception of scapular,

## 2018-04-01 ENCOUNTER — HOSPITAL ENCOUNTER (OUTPATIENT)
Dept: PHYSICAL THERAPY | Age: 53
Discharge: HOME | End: 2018-04-30
Attending: FAMILY MEDICINE | Admitting: FAMILY MEDICINE

## 2018-04-25 ENCOUNTER — OFFICE VISIT (OUTPATIENT)
Dept: ORTHOPEDIC SURGERY | Age: 53
End: 2018-04-25

## 2018-04-25 VITALS
HEART RATE: 69 BPM | WEIGHT: 136.02 LBS | BODY MASS INDEX: 24.1 KG/M2 | HEIGHT: 63 IN | DIASTOLIC BLOOD PRESSURE: 78 MMHG | SYSTOLIC BLOOD PRESSURE: 128 MMHG

## 2018-04-25 DIAGNOSIS — G89.29 CHRONIC LEFT SHOULDER PAIN: ICD-10-CM

## 2018-04-25 DIAGNOSIS — S46.012D STRAIN OF LEFT ROTATOR CUFF CAPSULE, SUBSEQUENT ENCOUNTER: Primary | ICD-10-CM

## 2018-04-25 DIAGNOSIS — M25.512 CHRONIC LEFT SHOULDER PAIN: ICD-10-CM

## 2018-04-25 PROCEDURE — 99213 OFFICE O/P EST LOW 20 MIN: CPT | Performed by: FAMILY MEDICINE

## 2018-04-25 PROCEDURE — G8427 DOCREV CUR MEDS BY ELIG CLIN: HCPCS | Performed by: FAMILY MEDICINE

## 2018-04-25 PROCEDURE — G8420 CALC BMI NORM PARAMETERS: HCPCS | Performed by: FAMILY MEDICINE

## 2018-04-25 PROCEDURE — 1036F TOBACCO NON-USER: CPT | Performed by: FAMILY MEDICINE

## 2018-04-25 PROCEDURE — 3017F COLORECTAL CA SCREEN DOC REV: CPT | Performed by: FAMILY MEDICINE

## 2019-05-06 ENCOUNTER — OFFICE VISIT (OUTPATIENT)
Dept: ORTHOPEDIC SURGERY | Age: 54
End: 2019-05-06
Payer: COMMERCIAL

## 2019-05-06 VITALS
WEIGHT: 136.02 LBS | HEIGHT: 63 IN | HEART RATE: 66 BPM | BODY MASS INDEX: 24.1 KG/M2 | SYSTOLIC BLOOD PRESSURE: 112 MMHG | DIASTOLIC BLOOD PRESSURE: 74 MMHG

## 2019-05-06 DIAGNOSIS — M22.42 CHONDROMALACIA OF LEFT PATELLA: Primary | ICD-10-CM

## 2019-05-06 DIAGNOSIS — M25.562 ACUTE PAIN OF LEFT KNEE: ICD-10-CM

## 2019-05-06 PROCEDURE — 20610 DRAIN/INJ JOINT/BURSA W/O US: CPT | Performed by: FAMILY MEDICINE

## 2019-05-06 PROCEDURE — 99214 OFFICE O/P EST MOD 30 MIN: CPT | Performed by: FAMILY MEDICINE

## 2019-05-06 RX ORDER — BETAMETHASONE SODIUM PHOSPHATE AND BETAMETHASONE ACETATE 3; 3 MG/ML; MG/ML
12 INJECTION, SUSPENSION INTRA-ARTICULAR; INTRALESIONAL; INTRAMUSCULAR; SOFT TISSUE ONCE
Status: COMPLETED | OUTPATIENT
Start: 2019-05-06 | End: 2019-05-06

## 2019-05-06 RX ORDER — NABUMETONE 750 MG/1
750 TABLET, FILM COATED ORAL 2 TIMES DAILY
Qty: 180 TABLET | Refills: 0 | Status: SHIPPED | OUTPATIENT
Start: 2019-05-06 | End: 2020-02-24

## 2019-05-06 RX ORDER — DEXTROAMPHETAMINE SACCHARATE, AMPHETAMINE ASPARTATE MONOHYDRATE, DEXTROAMPHETAMINE SULFATE AND AMPHETAMINE SULFATE 2.5; 2.5; 2.5; 2.5 MG/1; MG/1; MG/1; MG/1
10 CAPSULE, EXTENDED RELEASE ORAL EVERY MORNING
COMMUNITY

## 2019-05-06 RX ADMIN — BETAMETHASONE SODIUM PHOSPHATE AND BETAMETHASONE ACETATE 12 MG: 3; 3 INJECTION, SUSPENSION INTRA-ARTICULAR; INTRALESIONAL; INTRAMUSCULAR; SOFT TISSUE at 08:43

## 2019-05-06 NOTE — PROGRESS NOTES
document?: No     I have reviewed and agree with the documentation of the HPI documented by my . I will make any changes if necessary. Enc Date: 5/6/2019  Time: 4:36 PM  Provider: Redgie Kayser, MD        Review of Systems  Pertinent items are noted in HPI  Review of systems reviewed from Patient History Form dated on 5/6/2019 and available in the patient's chart under the Media tab. Vital Signs     /74   Pulse 66   Ht 5' 2.99\" (1.6 m)   Wt 136 lb 0.4 oz (61.7 kg)   LMP 02/15/2016   BMI 24.10 kg/m²     Current Outpatient Medications   Medication Sig Dispense Refill    amphetamine-dextroamphetamine (ADDERALL XR) 10 MG extended release capsule Take 10 mg by mouth every morning.  nabumetone (RELAFEN) 750 MG tablet Take 1 tablet by mouth 2 times daily 180 tablet 0    Cholecalciferol (VITAMIN D) 2000 UNITS CAPS capsule Take by mouth Pt taking 2 capsules daily       No current facility-administered medications for this visit. General Exam:   Constitutional: Patient is adequately groomed with no evidence of malnutrition  DTRs: Deep tendon reflexes are intact  Mental Status: The patient is oriented to time, place and person. The patient's mood and affect are appropriate. Lymphatic: The lymphatic examination bilaterally reveals all areas to be without enlargement or induration. Vascular: Examination reveals no swelling or calf tenderness. Peripheral pulses are palpable and 2+. Neurological: The patient has good coordination. There is no weakness or sensory deficit. Left knee Examination    Inspection:  There is no high-grade deformity or substantial soft tissue swelling although she does have some patellofemoral crepitation. She may have a very subtle Baker cyst.    Palpation:  She does have tenderness over the medial and lateral patellofemoral facet and is pain with patellar grind testing which does produce some of her posterior discomfort.   There is no dictation. Please follow up with ordering provider. Left knee AP and PA weight-bearing sunrise and lateral films were obtained today and does show evidence of some patellofemoral arthropathy and mild narrowing of the medial joint space. No high-grade degenerative changes noted. Assessment & Plan:    Encounter Diagnoses   Name Primary?  Chondromalacia of left patella Yes    Acute pain of left knee        Orders Placed This Encounter   Procedures    XR KNEE LEFT (MIN 4 VIEWS)     Standing Status:   Future     Number of Occurrences:   1     Standing Expiration Date:   5/6/2020    AK ARTHROCENTESIS ASPIR&/INJ MAJOR JT/BURSA W/O US       Administrations This Visit     betamethasone acetate-betamethasone sodium phosphate (CELESTONE) injection 12 mg     Admin Date  05/06/2019  08:43 Action  Given Dose  12 mg Route  Intra-articular Site   Administered By  Jeffery Hedrick    Ordering Provider:  Jason Titus MD    NDC:  4780-3354-60    Lot#:  [id number]    :  [employer]    Patient Supplied?:  No                Treatment Plan:  Treatment options were discussed Marium García. We did review her plain films and exam findings. She does have some degenerative changes to the anterior and medial compartment but has had little in the way of treatment. We discussed updating her imaging but initially we will try a left knee steroid injection today using 2 mL of Celestone, 2 mL of Marcaine, 1 mL of Xylocaine. She would like to try emphasis of her home-based patellar protection program and we will place her on Relafen 750 mg 1 twice daily. I would like for her to hold off on her impact exercise program although I think she can perform Pilates based on pain and avoidance of direct kneeling on her knee. Icing and activity modification was discussed. We'll see her back in a few weeks and consider updated imaging versus formal therapy if she is failing to improve.   She is denying mechanical or instability symptoms currently. She will contact us in the interim with questions or concerns. This dictation was performed with a verbal recognition program (DRAGON) and it was checked for errors. It is possible that there are still dictated errors within this office note. If so, please bring any errors to my attention for an addendum. All efforts were made to ensure that this office note is accurate.

## 2019-07-29 ENCOUNTER — TELEPHONE (OUTPATIENT)
Dept: ORTHOPEDIC SURGERY | Age: 54
End: 2019-07-29

## 2019-07-29 ENCOUNTER — OFFICE VISIT (OUTPATIENT)
Dept: ORTHOPEDIC SURGERY | Age: 54
End: 2019-07-29
Payer: COMMERCIAL

## 2019-07-29 VITALS
WEIGHT: 136.02 LBS | DIASTOLIC BLOOD PRESSURE: 82 MMHG | BODY MASS INDEX: 24.1 KG/M2 | HEIGHT: 63 IN | SYSTOLIC BLOOD PRESSURE: 127 MMHG | HEART RATE: 57 BPM

## 2019-07-29 DIAGNOSIS — M22.42 CHONDROMALACIA OF LEFT PATELLA: Primary | ICD-10-CM

## 2019-07-29 DIAGNOSIS — M65.9 SYNOVITIS OF LEFT KNEE: ICD-10-CM

## 2019-07-29 DIAGNOSIS — M25.562 LEFT KNEE PAIN, UNSPECIFIED CHRONICITY: ICD-10-CM

## 2019-07-29 PROCEDURE — 99214 OFFICE O/P EST MOD 30 MIN: CPT | Performed by: FAMILY MEDICINE

## 2019-07-29 NOTE — PROGRESS NOTES
Chief Complaint  Knee Pain (CK LEFT KNEE)    Follow-up left knee pain with difficulty walking distances and exercising. History of Present Illness: Cornel Christian is a 46 y.o. 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 Latia Laureano who is being seen by today for a follow-up on her left knee. Follow Up Patient:       Kaykay Cuello is being seen in follow up today for acute left knee pain. Kaykay Cuello is nearly 3 months out from her last office visit where she received a cortisone injection. She states she was feeling good after that and had gone off of her nsaid by the end of June. She then went on a trip to [facility] with her kids for two weeks and walking on uneven surfaces and with poor foot wear at times she states her knee flared back up during her trip as she was walking 15-18,000 steps per day on uneven surfaces. She was taking episodic Advil while on vacation but does not recall a specific history of injury. . She went back on her NSAID when she got back and states her knee is heading back in the right direction but is only improved about 20% over the past 12 days. She does have crepitation and popping with some mild swelling. She has been resting and not doing her normal workouts. . However, it is still giving her daily trouble. Kaykay Cuello has attempted rest, ice, NSAID- nabumetone, home exercises to treat this problem and symptoms are still present. She would like to be able to get back into an exercise routine, but is also limited by an old hip injury. Pain Assessment  Location of Pain: Knee  Location Modifiers: Left  Severity of Pain: 3  Quality of Pain: Aching, Dull  Duration of Pain: Persistent  Frequency of Pain: Constant  Aggravating Factors:  Other (Comment), Standing, Walking, Exercise(SITTING TO STANDING)  Limiting Behavior: Yes  Relieving Factors: Rest  Result of Injury:

## 2019-08-19 ENCOUNTER — OFFICE VISIT (OUTPATIENT)
Dept: ORTHOPEDIC SURGERY | Age: 54
End: 2019-08-19
Payer: COMMERCIAL

## 2019-08-19 VITALS
HEIGHT: 63 IN | HEART RATE: 78 BPM | DIASTOLIC BLOOD PRESSURE: 70 MMHG | WEIGHT: 136.02 LBS | BODY MASS INDEX: 24.1 KG/M2 | SYSTOLIC BLOOD PRESSURE: 112 MMHG

## 2019-08-19 DIAGNOSIS — M17.12 PRIMARY OSTEOARTHRITIS OF LEFT KNEE: Primary | ICD-10-CM

## 2019-08-19 DIAGNOSIS — M22.42 CHONDROMALACIA PATELLAE OF LEFT KNEE: ICD-10-CM

## 2019-08-19 DIAGNOSIS — M25.562 ACUTE PAIN OF LEFT KNEE: ICD-10-CM

## 2019-08-19 DIAGNOSIS — M76.892 KNEE CAPSULITIS, LEFT: ICD-10-CM

## 2019-08-19 PROCEDURE — 20610 DRAIN/INJ JOINT/BURSA W/O US: CPT | Performed by: FAMILY MEDICINE

## 2019-08-19 PROCEDURE — 99214 OFFICE O/P EST MOD 30 MIN: CPT | Performed by: FAMILY MEDICINE

## 2019-08-19 RX ORDER — BETAMETHASONE SODIUM PHOSPHATE AND BETAMETHASONE ACETATE 3; 3 MG/ML; MG/ML
12 INJECTION, SUSPENSION INTRA-ARTICULAR; INTRALESIONAL; INTRAMUSCULAR; SOFT TISSUE ONCE
Status: COMPLETED | OUTPATIENT
Start: 2019-08-19 | End: 2019-08-19

## 2019-08-19 RX ADMIN — BETAMETHASONE SODIUM PHOSPHATE AND BETAMETHASONE ACETATE 12 MG: 3; 3 INJECTION, SUSPENSION INTRA-ARTICULAR; INTRALESIONAL; INTRAMUSCULAR; SOFT TISSUE at 10:12

## 2019-08-19 NOTE — PROGRESS NOTES
knee osteoarthritis and chondromalacia patella. Is locking catching or true instability symptoms. She has been able to change her workouts and would like to get back into her normal workout routine. Most of her pain is with positional changes and repetitive stairclimbing. This is primarily anterior but there is some medial component consistent with her capsulitis. Denies instability symptoms locking or catching. Pain Assessment  Location of Pain: Knee  Location Modifiers: Left  Severity of Pain: 3  Quality of Pain: Aching  Duration of Pain: Persistent  Frequency of Pain: Constant  Aggravating Factors: Bending, Straightening, Stretching, Standing, Walking  Limiting Behavior: Yes  Relieving Factors: Rest, Nsaids  Result of Injury: No  Work-Related Injury: No  Are there other pain locations you wish to document?: No     Attest: I have reviewed and attest the documentation of the HPI documented by my . I will make any changes if necessary. Enc Date: 8/19/2019  Time: 10:06 PM  Provider: Edwin Alvarez MD        Social History     Tobacco Use    Smoking status: Never Smoker    Smokeless tobacco: Never Used   Substance Use Topics    Alcohol use: No     Alcohol/week: 0.0 standard drinks     Comment: couple x month    Drug use: No        Review of Systems  Pertinent items are noted in HPI  Review of systems reviewed from Patient History Form dated on 8/19/2019 and available in the patient's chart under the Media tab. Vital Signs     /70   Pulse 78   Ht 5' 2.99\" (1.6 m)   Wt 136 lb 0.4 oz (61.7 kg)   LMP 02/15/2016   BMI 24.10 kg/m²       General Exam:   Constitutional: Patient is adequately groomed with no evidence of malnutrition  DTRs: Deep tendon reflexes are intact  Mental Status: The patient is oriented to time, place and person. The patient's mood and affect are appropriate.   Lymphatic: The lymphatic examination bilaterally reveals all areas to be without enlargement or induration. Vascular: Examination reveals no swelling or calf tenderness. Peripheral pulses are palpable and 2+. Neurological: The patient has good coordination. There is no weakness or sensory deficit. Left knee examination          Inspection: No high-grade deformity or tense effusion. She does have some mild patellofemoral crepitation. May have a subtle Baker's cyst.    Palpation: Have clinical tenderness over the medial and lateral patellofemoral facet and mild pain patellar grind testing. She is diffusely tender along the medial joint line but no evidence of MCL instability. Rang of Motion: She is stiff in the terminal 5 degrees of extension with flexion painful anteriorly beyond 120. Strength: 4 out of 5 with flexion and extension. Special Tests: Does have some pain with patellar grind testing. No instability with valgus stress. Negative varus stress anterior posterior drawer testing. Some pain with medial Carlos Eduardo's but no appreciable click. Hip testing is benign. Skin: There are no rashes, ulcerations or lesions. Distal motor sensory and vascular exam is intact. Gait: Fluid smooth gait       Reflex symmetrically preserved      Additional Comments:             Additional Examinations:     Contralateral Exam: Examination of the right knee reveals intact skin. There is no focal tenderness. The patient demonstrates full painless range of motion with regards to flexion and extension. Strength is 5/5 thorough out all planes. Ligamentous stability is grossly intact. Examination of the bilateral hip reveals intact skin. The patient demonstrates full painless range of motion with regards to flexion, abduction, internal and external rotation. There is not tenderness about the greater trochanter. There is a negative straight leg raise against resistance. Strength is 5/5 thorough out all planes.     Right Lower Extremity: Examination of the right lower extremity does not show  Dehisced De La Torre's cyst is contributory. 4. Subtle patellofemoral chondromalacia. 5. Please see above.       Thank you for the opportunity to provide your interpretation.               Los Romo MD       A: Roberto 08/11/2019 12:56 PM   T: Maria Victoria [place]he[place] 08/10/2019 12:26 PM           Assessment & Plan:    Encounter Diagnoses   Name Primary?  Primary osteoarthritis of left knee Yes    Chondromalacia patellae of left knee     Knee capsulitis, left     Acute pain of left knee        Orders Placed This Encounter   Procedures    MA ARTHROCENTESIS ASPIR&/INJ MAJOR JT/BURSA W/O US    EUFLEXXA INJ PER DOSE     LEFT KNEE     Standing Status:   Future     Standing Expiration Date:   8/18/2020       Administrations This Visit     betamethasone acetate-betamethasone sodium phosphate (CELESTONE) injection 12 mg     Admin Date  08/19/2019  10:12 Action  Given Dose  12 mg Route  Intra-articular Site  Knee Left Administered By  Zoie Ruff    Ordering Provider:  Sepideh Demarco MD    NDC:  0308-6957-02    Lot#:  [id number]    :  [employer]    Patient Supplied?:  No                 Treatment Plan:  Treatment options were discussed Helmut Phoenix. We did review her MRI films and exam findings. She has no evidence of displaced meniscus tear but does have some medial capsulitis with underlying mild left knee weightbearing osteoarthritis with chondromalacia patella. Her symptoms have only improved about 30% since starting treatment last month. We did perform a left knee steroid injection today using 2 cc of Celestone, 2 cc of Marcaine, 1 of Xylocaine. She will continue with her knee brace and her exercise program and modification to her workouts. I would recommend continuing with her Relafen 750 mg 1 pill twice daily. Potential for Visco supplementation over the next several weeks was discussed. Icing and activity modification and modification of her exercise workouts were discussed.   We will see her

## 2019-08-23 ENCOUNTER — TELEPHONE (OUTPATIENT)
Dept: ORTHOPEDIC SURGERY | Age: 54
End: 2019-08-23

## 2019-08-23 NOTE — TELEPHONE ENCOUNTER
08/23/2019 EUFLEXXA  (SERIES OF 3)  LEFT KNEE. NO AUTHORIZATION REQUIRED. VALID & BILLABLE. CAN BUY& BILL. [facility] [id number].   AP

## 2019-08-26 ENCOUNTER — TELEPHONE (OUTPATIENT)
Dept: ORTHOPEDIC SURGERY | Age: 54
End: 2019-08-26

## 2019-08-26 NOTE — TELEPHONE ENCOUNTER
Spoke to patient and let them know that euflexxa injections have been approved for their LEFT knee. Scheduled patient for those injections.

## 2019-09-09 ENCOUNTER — OFFICE VISIT (OUTPATIENT)
Dept: ORTHOPEDIC SURGERY | Age: 54
End: 2019-09-09
Payer: COMMERCIAL

## 2019-09-09 VITALS
WEIGHT: 136.02 LBS | BODY MASS INDEX: 24.1 KG/M2 | HEIGHT: 63 IN | DIASTOLIC BLOOD PRESSURE: 86 MMHG | HEART RATE: 71 BPM | SYSTOLIC BLOOD PRESSURE: 135 MMHG

## 2019-09-09 DIAGNOSIS — G89.29 CHRONIC PAIN OF LEFT KNEE: Primary | ICD-10-CM

## 2019-09-09 DIAGNOSIS — M17.12 PRIMARY OSTEOARTHRITIS OF LEFT KNEE: ICD-10-CM

## 2019-09-09 DIAGNOSIS — M22.42 CHONDROMALACIA PATELLAE OF LEFT KNEE: ICD-10-CM

## 2019-09-09 DIAGNOSIS — M25.562 CHRONIC PAIN OF LEFT KNEE: Primary | ICD-10-CM

## 2019-09-09 PROCEDURE — 99213 OFFICE O/P EST LOW 20 MIN: CPT | Performed by: FAMILY MEDICINE

## 2019-09-09 PROCEDURE — 20610 DRAIN/INJ JOINT/BURSA W/O US: CPT | Performed by: FAMILY MEDICINE

## 2019-09-16 ENCOUNTER — OFFICE VISIT (OUTPATIENT)
Dept: ORTHOPEDIC SURGERY | Age: 54
End: 2019-09-16
Payer: COMMERCIAL

## 2019-09-16 VITALS — BODY MASS INDEX: 24.1 KG/M2 | HEIGHT: 63 IN | WEIGHT: 136.02 LBS

## 2019-09-16 DIAGNOSIS — M22.42 CHONDROMALACIA PATELLAE OF LEFT KNEE: ICD-10-CM

## 2019-09-16 DIAGNOSIS — G89.29 CHRONIC PAIN OF LEFT KNEE: Primary | ICD-10-CM

## 2019-09-16 DIAGNOSIS — M17.12 PRIMARY OSTEOARTHRITIS OF LEFT KNEE: ICD-10-CM

## 2019-09-16 DIAGNOSIS — M25.562 CHRONIC PAIN OF LEFT KNEE: Primary | ICD-10-CM

## 2019-09-16 PROCEDURE — 20610 DRAIN/INJ JOINT/BURSA W/O US: CPT | Performed by: FAMILY MEDICINE

## 2019-09-16 RX ORDER — HYALURONATE SODIUM 10 MG/ML
20 SYRINGE (ML) INTRAARTICULAR ONCE
Status: COMPLETED | OUTPATIENT
Start: 2019-09-16 | End: 2019-09-16

## 2019-09-16 RX ADMIN — Medication 20 MG: at 08:28

## 2019-10-02 ENCOUNTER — OFFICE VISIT (OUTPATIENT)
Dept: ORTHOPEDIC SURGERY | Age: 54
End: 2019-10-02
Payer: COMMERCIAL

## 2019-10-02 VITALS — HEIGHT: 63 IN | BODY MASS INDEX: 24.1 KG/M2 | WEIGHT: 136.02 LBS

## 2019-10-02 DIAGNOSIS — M22.42 CHONDROMALACIA PATELLAE OF LEFT KNEE: ICD-10-CM

## 2019-10-02 DIAGNOSIS — G89.29 CHRONIC PAIN OF LEFT KNEE: Primary | ICD-10-CM

## 2019-10-02 DIAGNOSIS — M25.562 CHRONIC PAIN OF LEFT KNEE: Primary | ICD-10-CM

## 2019-10-02 DIAGNOSIS — M17.12 PRIMARY OSTEOARTHRITIS OF LEFT KNEE: ICD-10-CM

## 2019-10-02 PROCEDURE — 20610 DRAIN/INJ JOINT/BURSA W/O US: CPT | Performed by: FAMILY MEDICINE

## 2019-10-02 RX ORDER — HYALURONATE SODIUM 10 MG/ML
20 SYRINGE (ML) INTRAARTICULAR ONCE
Status: COMPLETED | OUTPATIENT
Start: 2019-10-02 | End: 2019-10-02

## 2019-10-02 RX ADMIN — Medication 20 MG: at 08:56

## 2019-10-30 ENCOUNTER — OFFICE VISIT (OUTPATIENT)
Dept: ORTHOPEDIC SURGERY | Age: 54
End: 2019-10-30
Payer: COMMERCIAL

## 2019-10-30 VITALS
SYSTOLIC BLOOD PRESSURE: 119 MMHG | DIASTOLIC BLOOD PRESSURE: 74 MMHG | BODY MASS INDEX: 24.1 KG/M2 | HEART RATE: 68 BPM | HEIGHT: 63 IN | WEIGHT: 136.02 LBS

## 2019-10-30 DIAGNOSIS — M22.42 CHONDROMALACIA PATELLAE OF LEFT KNEE: ICD-10-CM

## 2019-10-30 DIAGNOSIS — M25.562 CHRONIC PAIN OF LEFT KNEE: ICD-10-CM

## 2019-10-30 DIAGNOSIS — G89.29 CHRONIC PAIN OF LEFT KNEE: ICD-10-CM

## 2019-10-30 DIAGNOSIS — M17.12 PRIMARY OSTEOARTHRITIS OF LEFT KNEE: Primary | ICD-10-CM

## 2019-10-30 PROCEDURE — 99214 OFFICE O/P EST MOD 30 MIN: CPT | Performed by: FAMILY MEDICINE

## 2019-10-30 RX ORDER — NABUMETONE 750 MG/1
750 TABLET, FILM COATED ORAL 2 TIMES DAILY
Qty: 60 TABLET | Refills: 5 | Status: SHIPPED | OUTPATIENT
Start: 2019-10-30 | End: 2020-02-24

## 2019-10-30 RX ORDER — METHYLPREDNISOLONE 4 MG/1
TABLET ORAL
Qty: 21 KIT | Refills: 0 | Status: SHIPPED | OUTPATIENT
Start: 2019-10-30 | End: 2020-02-24 | Stop reason: ALTCHOICE

## 2019-11-05 ENCOUNTER — HOSPITAL ENCOUNTER (OUTPATIENT)
Dept: PHYSICAL THERAPY | Age: 54
Setting detail: THERAPIES SERIES
Discharge: HOME | End: 2019-11-05
Payer: COMMERCIAL

## 2020-02-24 ENCOUNTER — OFFICE VISIT (OUTPATIENT)
Dept: ORTHOPEDIC SURGERY | Age: 55
End: 2020-02-24
Payer: COMMERCIAL

## 2020-02-24 VITALS — BODY MASS INDEX: 24.1 KG/M2 | HEIGHT: 63 IN | WEIGHT: 136.02 LBS

## 2020-02-24 PROCEDURE — L1812 KO ELASTIC W/JOINTS PRE OTS: HCPCS | Performed by: ORTHOPAEDIC SURGERY

## 2020-02-24 PROCEDURE — 99213 OFFICE O/P EST LOW 20 MIN: CPT | Performed by: ORTHOPAEDIC SURGERY

## 2020-02-24 RX ORDER — MELOXICAM 15 MG/1
15 TABLET ORAL DAILY PRN
Qty: 90 TABLET | Refills: 0 | Status: SHIPPED | OUTPATIENT
Start: 2020-02-24 | End: 2020-03-25 | Stop reason: SDUPTHER

## 2020-02-24 NOTE — PROGRESS NOTES
flexion. Mild retropatellar crepitation. Tight hamstrings noted. Strength:  4/5 quad strength. 4+/5 hamstring strength. Special Tests:  Negative Carlos Eduardo's exam.  Negative Lachman's exam.  Stable to varus and valgus stress testing. Positive patellar grind. Skin: There are no rashes, ulcerations or lesions. Gait: Normal gait pattern    Reflex normal deep tendon reflexes    Additional Comments:       Additional Examinations:         Contralateral Exam: Examination of the right knee reveals intact skin. There is no focal tenderness. The patient demonstrates full painless range of motion with regard to flexion and extension. Strength is 5/5 throughout all planes. Ligamentous stability is grossly intact. Examination of the left hip reveals intact skin. The patient demonstrates full painless range of motion with regards to flexion, abduction, internal and external rotation. There is some pain at the extremes of motion especially internal and external rotation. There is no tenderness about the greater trochanter. There is a negative straight leg raise against resistance. Strength is 5/5 throughout all planes. Radiology:     X-rays obtained and reviewed in office:  Views 4 views including AP weightbearing, PA flexed weightbearing, lateral and skyline  Location left knee  Impression there is relatively well-maintained joint space of all 3 compartments with no evidence of any high-grade arthritic changes, fractures, dislocations. X-ray examination including one view of the left hip, AP pelvis shows good over alignment of the hip joint with no evidence of fracture, dislocation, high-grade AVN or osteoarthritis. MRI results left knee                  Impression:  Encounter Diagnoses   Name Primary?     Chondromalacia patellae of left knee Yes    Pain of left hip joint        Office Procedures:  Orders Placed This Encounter   Procedures    XR KNEE LEFT (MIN 4 VIEWS)     Standing Status:   Future

## 2020-02-27 ENCOUNTER — HOSPITAL ENCOUNTER (OUTPATIENT)
Dept: PHYSICAL THERAPY | Age: 55
Setting detail: THERAPIES SERIES
Discharge: HOME | End: 2020-02-27
Payer: COMMERCIAL

## 2020-02-27 PROCEDURE — 97112 NEUROMUSCULAR REEDUCATION: CPT | Performed by: PHYSICAL THERAPIST

## 2020-02-27 PROCEDURE — 97110 THERAPEUTIC EXERCISES: CPT | Performed by: PHYSICAL THERAPIST

## 2020-02-27 PROCEDURE — 97161 PT EVAL LOW COMPLEX 20 MIN: CPT | Performed by: PHYSICAL THERAPIST

## 2020-02-27 PROCEDURE — 97140 MANUAL THERAPY 1/> REGIONS: CPT | Performed by: PHYSICAL THERAPIST

## 2020-02-27 NOTE — FLOWSHEET NOTE
[facility],  [address]  Phone: [phone]  Fax [phone]    Physical Therapy Daily Treatment Note  Date:  2020    Patient Name:  Alejandro Be    :  1965  MRN: [id number]  Restrictions/Precautions:  LT KNEE CMP/PPP PROTOCOL  Physician Information:  Referring Practitioner: Megan Fernandes MD  Medical/Treatment Diagnosis Information:  · Diagnosis: M22.42 (ICD-10-CM) - Chondromalacia patellae of left knee  Treatment Diagnosis: Left knee pain M25.562  ·   [x] Conservative / [] Surgical - DOS:  Therapy Diagnosis/Practice Pattern: A   Insurance/Certification information:     Plan of care signed: [] YES  [] NO  Number of Comorbidities:  []0     [x]1-2    []3+  Date of Patient follow up with Physician:     Is this a Progress Report:     []  Yes  [x]  No        If Yes:  Date Range for reporting period:  Beginning  Ending    Progress report will be due (10 Rx or 30 days whichever is less):        Recertification will be due (POC Duration  / 90 days whichever is less):  6 weeks     Latex Allergy:  [x]NO      []YES  Preferred Language for Healthcare:   [x]English       []other:    Visit # Insurance Allowable Reporting Period   1 ?  Begin Date: 2020               End Date:      RECERT DUE BY: 6 weeks    SUBJECTIVE:  See eval    OBJECTIVE: See eval   Observation:   Palpation:     Test used Initial score Current Score   Pain Summary VAS 3-6/10    Functional questionnaire LEFS 18%    ROM flexion WNL     extension WNL    Strength quad Tone 3+/5 with LTP     ABD 5-     flexion 4/5         RESTRICTIONS/PRECAUTIONS:     Exercises/Interventions:     Therapeutic Ex Sets/reps Notes   Supine HS/lat HS  stretch :30x3 ea    Supine ITB stretch :30x3    gastroc stretch incline :30x5    SLR flexion seated x10    SLR abd 2x10         Pt ed anatomy, RICE, PT progression, findings, HEP 10 min              Hand heel rock x10                                       Manual Intervention     Supine lumbo pelvic roll L X cavitation   Assessment of pelvis post manip 5' Level landmarks and (-) FF                       NMR re-education                                                      Therapeutic Exercise and NMR EXR  [x] (15414) Provided verbal/tactile cueing for activities related to strengthening, flexibility, endurance, ROM for improvements in LE, proximal hip, and core control with self care, mobility, lifting, ambulation.  [] (51816) Provided verbal/tactile cueing for activities related to improving balance, coordination, kinesthetic sense, posture, motor skill, proprioception  to assist with LE, proximal hip, and core control in self care, mobility, lifting, ambulation and eccentric single leg control.      NMR and Therapeutic Activities:    [x] (40763 or 61079) Provided verbal/tactile cueing for activities related to improving balance, coordination, kinesthetic sense, posture, motor skill, proprioception and motor activation to allow for proper function of core, proximal hip and LE with self care and ADLs  [] (72964) Gait Re-education- Provided training and instruction to the patient for proper LE, core and proximal hip recruitment and positioning and eccentric body weight control with ambulation re-education including up and down stairs     Home Exercise Program:    [x] (26410) Reviewed/Progressed HEP activities related to strengthening, flexibility, endurance, ROM of core, proximal hip and LE for functional self-care, mobility, lifting and ambulation/stair navigation   [] (33162)Reviewed/Progressed HEP activities related to improving balance, coordination, kinesthetic sense, posture, motor skill, proprioception of core, proximal hip and LE for self care, mobility, lifting, and ambulation/stair navigation      Manual Treatments:  PROM / STM / Oscillations-Mobs:  G-I, II, III, IV (PA's, Inf., Post.)  [x] (24266) Provided manual therapy to mobilize LE, proximal hip and/or LS spine soft tissue/joints for the purpose of modulating pain, promoting relaxation,  increasing ROM, reducing/eliminating soft tissue swelling/inflammation/restriction, improving soft tissue extensibility and allowing for proper ROM for normal function with self care, mobility, lifting and ambulation. Modalities:       [] GR/ESU 15 min    [] GR 15 min  [] ESU     [] CP    [] MHP    [] declined     Charges:  Timed Code Treatment Minutes: 40   Total Treatment Minutes: 60     BWC time in/time out:   (and requires time in and out for each CPT code)    [x] EVAL (LOW) 69391 (typically 20 minutes face-to-face)  [] EVAL (MOD) 74273 (typically 30 minutes face-to-face)  [] EVAL (HIGH) 96827 (typically 45 minutes face-to-face)  [] RE-EVAL     [x] QD(92630) x  1   [] IONTO  [x] NMR (30198) x   1  [] VASO  [x] Manual (88930) x  1    [] Other:  [] TA x      [] Mech Traction (51662)  [] ES(attended) (35046)      [] ES (un) (40516):     GOALS:   Patient stated goal: reduce pian and return to exercise  [] Progressing: [] Met: [] Not Met: [] Adjusted    Therapist goals for Patient:   Short Term Goals: To be achieved in: 2 weeks  1. Independent in HEP and progression per patient tolerance, in order to prevent re-injury. [] Progressing: [] Met: [] Not Met: [] Adjusted  2. Patient will have a decrease in pain to facilitate improvement in movement, function, and ADLs as indicated by Functional Deficits. [] Progressing: [] Met: [] Not Met: [] Adjusted      Long Term Goals: To be achieved in: 6 weeks  1. Disability index score of 9% or less for the LEFS  to assist with reaching prior level of function. [] Progressing: [] Met: [] Not Met: [] Adjusted  2. Patient will demonstrate increased AROM to WNL, good LS mobility, good hip ROM to allow for proper joint functioning as indicated by patients Functional Deficits. [] Progressing: [] Met: [] Not Met: [] Adjusted  3.  Patient will Ready to Return to Play, Meets All Above Stages   []  Not Ready for Return to Sports   Comments:                               PLAN: See eval  [] Continue per plan of care [] Alter current plan (see comments above)  [x] Plan of care initiated [] Hold pending MD visit [] Discharge      Electronically signed by:  Aysha Maxwell PT,MSPT, OMT-C [id number]    Note: If patient does not return for scheduled/ recommended follow up visits, this note will serve as a discharge from care along with most recent update on progress.

## 2020-02-27 NOTE — PLAN OF CARE
[facility], [address], [address] [place] [address]  Phone: [phone]  Fax [phone]     Physical Therapy Certification    Dear Referring Practitioner: Ciara Sweet MD,    We had the pleasure of evaluating the following patient for physical therapy services at [address] [place] [address]. A summary of our findings can be found in the initial assessment below. This includes our plan of care. If you have any questions or concerns regarding these findings, please do not hesitate to contact me at the office phone number checked above. Thank you for the referral.       Physician Signature:_______________________________Date:__________________  By signing above (or electronic signature), therapists plan is approved by physician      Patient: Andreas Milner   : 1965   MRN: [id number]  Referring Physician: Referring Practitioner: Ciara Sweet MD      Evaluation Date: 2020      Medical Diagnosis Information:  Diagnosis: M22.42 (ICD-10-CM) - Chondromalacia patellae of left knee    treatment diagnosis:Left knee pain M25.562                                           Insurance information:        Precautions/ Contra-indications: LT KNEE CMP/PPP PROTOCOL  Latex Allergy:  [x]NO      []YES  Preferred Language for Healthcare:   [x]English       []other:    SUBJECTIVE: Patient stated complaint:Pt. Reports that she had a fall last April while walking her dog and landed on both knees, more on the L. The pain began in May of 2019. Underwent cortisone x2 which helped and then Euflexa which seems to worsen the pain. The pain is still occurring with daily activity and stopped with exercising due to pain and Father was ill. Started walking again in January and the pain is worse with walking, has also started trying some basic ex. Without aggravating the knee.  Also notes that she had a HF injury in  with limitation of riding a bike or elliptical    Relevant Medical History:h/o L hip injury, fall on knees in 2019    Height 5'3\"  Weight 137 lb  Easing factors: cortisone, activity modification, meloxicam, ice while on,  Provocative factors: getting up from sitting, kneeling, squatting, steps     Type: [x]Constant without meds   [x]Intermittent with meds and low activity  []Radiating [x]Localized []other:     Paresthesias: none  Occupation/School: -sitting    Living Status/Prior Level of Function: Independent with ADLs and IADLs, pilates, lift weight, walking (3 mi) belongs to [employer], hiking in woods      OBJECTIVE:      Initial Initial Current   VAS 3-6/10     LEFS 18%     ROM LEFT RIGHT    HIP Flex 140 140    HIP Abd WFL WNL    HIP add      HIP Ext WNL WNL    HIP IR 25 32    HIP ER 55 55    Knee ext 4 4    Knee Flex 145 147    Ankle PF 40 40    Ankle DF 10 10    Ankle In 30 30    Ankle Ev 6 20    Strength  LEFT RIGHT    HIP Flexors 5 5    HIP Abductors 5- 5-    HIP add      HIP Ext 5 5    Hip ER 5 5    Hip IR 4/5 5    Knee EXT (quad) 5 5    Knee Flex (HS) 4/5 5    Ankle DF 5 5    Ankle PF 5 5    Ankle Inv 5 5    Ankle EV 5 5    RF 4- 4/5    Circumference  MP  SP   33  32   32.5  32    LE Dermatomes WNL WNL    LE myotomes WNL WNL        Flexibility L R Comment   Hamstring Min tight at 90     Gastroc tight       ITB Tight distally      Quad      Hip flexor       Glut KTC tight      KTOS       Piriformis ig 4       Tyler             Reflexes/Sensation:    [x]Dermatomes/Myotomes intact    [x]Reflexes equal and normal bilaterally   [x]Other:(-) clonus    Joint mobility:    [x]Normal    []Hypo   []Hyper    Palpation: min tender over distal ITB    Functional Mobility/Transfers: indep    Posture: R crest and PSIS high in standing level in sitting, (+)FF L in sitting and standing, (+)supine to sit L long    Bandages/Dressings/Incisions: none    Gait: (include devices/WB status) slight increase pronation through the gait cycle    Orthopedic Special Tests:   HIP KNEE ANKLE   Test Result Test Result Test Result   Scour  Lachman's  Anterior Drawer    Log Roll  MCMurrays's (-) Talar Tilt    TORI  Valgus  (-) Squeeze Test    FADIR  Varus      Alejandro's  Posterior Drawer        Patellar Apprehension (+)       SLB >10 sec B  SL squat-inc valgus and lack of hip control                       [x] Patient history, allergies, meds reviewed. Medical chart reviewed. See intake form. Review Of Systems (ROS):  [x]Performed Review of systems (Integumentary, CardioPulmonary, Neurological) by intake and observation. Intake form has been scanned into medical record. Patient has been instructed to contact their primary care physician regarding ROS issues if not already being addressed at this time.       Co-morbidities/Complexities (which will affect course of rehabilitation):   []None           Arthritic conditions   []Rheumatoid arthritis (M05.9)  []Osteoarthritis (M19.91)   Cardiovascular conditions   []Hypertension (I10)  []Hyperlipidemia (E78.5)  []Angina pectoris (I20)  []Atherosclerosis (I70)   Musculoskeletal conditions   []Disc pathology   []Congenital spine pathologies   []Prior surgical intervention  []Osteoporosis (M81.8)  []Osteopenia (M85.8)   Endocrine conditions   []Hypothyroid (E03.9)  []Hyperthyroid Gastrointestinal conditions   []Constipation (J33.14)   Metabolic conditions   []Morbid obesity (E66.01)  []Diabetes type 1(E10.65) or 2 (E11.65)   []Neuropathy (G60.9)     Pulmonary conditions   []Asthma (J45)  []Coughing   []COPD (J44.9)   Psychological Disorders  []Anxiety (F41.9)  []Depression (F32.9)   []Other:   [x]Other:  Previous injury to L hip 2002        Barriers to/and or personal factors that will affect rehab potential:              []Age  []Sex              []Motivation/Lack of Motivation                        []Co-Morbidities              []Cognitive Function, education/learning barriers []Environmental, home barriers              []profession/work barriers  []past PT/medical experience  []other:  Justification: Pt. Presents with s/s of PF pain with limited L hip and ankle mobility, dec quad tone and pain with functional activity. She will likely respond well to PT intervention to improve hip and ankle mobility, quad strength, ecc control and overall functional progression. Falls Risk Assessment (30 days):   [x] Falls Risk assessed and no intervention required. [] Falls Risk assessed and Patient requires intervention due to being higher risk   TUG score (>12s at risk):     [] Falls education provided. G-Codes:        ASSESSMENT: Pt. Is a 53 y.o. female pt. Who presents with h/o L knee pain onset after a fall with past h/o L hip injury. She has limited hip and ankle mobility L, retropatellar pain, over pronation with gait, LTP with QS and provocation of symptoms with sitting, kneeling, squatting and with stairs. She also presents with pelvic asymmetry. She will benefit from PT to address deficits and progress to PLOF.    Functional Impairments:     [x]Noted lumbar/proximal hip/LE joint hypomobility   [x]Decreased LE functional ROM   [x]Decreased core/proximal hip strength and neuromuscular control   []Decreased LE functional strength   [x]Reduced balance/proprioceptive control   []other:      Functional Activity Limitations (from functional questionnaire and intake)   [x]Reduced ability to tolerate prolonged functional positions   [x]Reduced ability or difficulty with changes of positions or transfers between positions   []Reduced ability to maintain good posture and demonstrate good body mechanics with sitting, bending, and lifting   []Reduced ability to sleep   [] Reduced ability or tolerance with driving and/or computer work   []Reduced ability to perform lifting, carrying tasks   [x]Reduced ability to squat   []Reduced ability to forward bend   []Reduced ability to ambulate patients Functional Deficits. [] Progressing: [] Met: [] Not Met: [] Adjusted  3. Patient will demonstrate an increase in Strength to good proximal hip and core activation to allow for proper functional mobility as indicated by patients Functional Deficits. [] Progressing: [] Met: [] Not Met: [] Adjusted  4. Patient will return to exercise machines functional activities without increased symptoms or restriction. [] Progressing: [] Met: [] Not Met: [] Adjusted  5. Pt.  To be able to return to walking, negotiating steps and perform functional squatting without pain(patient specific functional goal)    [] Progressing: [] Met: [] Not Met: [] Adjusted     Electronically signed by:  Anuradha Davis PT, [facility], T-C [id number]

## 2020-03-03 ENCOUNTER — HOSPITAL ENCOUNTER (OUTPATIENT)
Dept: PHYSICAL THERAPY | Age: 55
Setting detail: THERAPIES SERIES
Discharge: HOME | End: 2020-03-03
Payer: COMMERCIAL

## 2020-03-03 PROCEDURE — 97112 NEUROMUSCULAR REEDUCATION: CPT | Performed by: PHYSICAL THERAPIST

## 2020-03-03 PROCEDURE — 97140 MANUAL THERAPY 1/> REGIONS: CPT | Performed by: PHYSICAL THERAPIST

## 2020-03-03 PROCEDURE — 97110 THERAPEUTIC EXERCISES: CPT | Performed by: PHYSICAL THERAPIST

## 2020-03-03 NOTE — FLOWSHEET NOTE
[facility],  [address]  Phone: [phone]  Fax [phone]    Physical Therapy Daily Treatment Note  Date:  3/3/2020    Patient Name:  Alejandro Be    :  1965  MRN: [id number]  Restrictions/Precautions:  LT KNEE CMP/PPP PROTOCOL  Physician Information:  Referring Practitioner: Megan Fernandes MD  Medical/Treatment Diagnosis Information:  · Diagnosis: M22.42 (ICD-10-CM) - Chondromalacia patellae of left knee  Treatment Diagnosis: Left knee pain M25.562  ·   [x] Conservative / [] Surgical - DOS:  Therapy Diagnosis/Practice Pattern: A   Insurance/Certification information:     Plan of care signed: [] YES  [] NO  Number of Comorbidities:  []0     [x]1-2    []3+  Date of Patient follow up with Physician:     Is this a Progress Report:     []  Yes  [x]  No        If Yes:  Date Range for reporting period:  Beginning  Ending    Progress report will be due (10 Rx or 30 days whichever is less):        Recertification will be due (POC Duration  / 90 days whichever is less):  6 weeks     Latex Allergy:  [x]NO      []YES  Preferred Language for Healthcare:   [x]English       []other:    Visit # Insurance Allowable Reporting Period   2 ? Begin Date: 2020               End Date:      RECERT DUE BY: 6 weeks    SUBJECTIVE:  Pt. Reports that the knee is feeling much better, not having pain today for the first time. Taking meloxicam currently.     OBJECTIVE:    Observation:level crests and PSIS in sitting and standing   Palpation: tender at distal ITB     Test used Initial score Current Score   Pain Summary VAS 3-6/10    Functional questionnaire LEFS 18%    ROM flexion WNL     extension WNL    Strength quad Tone 3+/5 with LTP     ABD 5-     flexion 4/5         RESTRICTIONS/PRECAUTIONS:     Exercises/Interventions:     Therapeutic Ex Sets/reps Notes   Supine HS/lat HS  stretch :30x3 ea    Supine ITB stretch gastroc stretch incline :30x5    SLR flexion seated :5x10    SLR abd 3x10         Prone SLR ext     Pt ed anatomy, RICE, PT progression, findings, HEP               Hand heel rock HEP                                       Manual Intervention     Supine lumbo pelvic roll L  cavitation   Assessment of pelvis  3' Level landmarks and (-) FF   ITB stretch in SL :30x3    Tyler' stretch-small range due to [facility] :20x3    ITB roller with stick 8'         NMR re-education     Gliders post and lat x20 ea    Wall squat holds with SB :10x10    FSU 4\" x20    Tandem bal with cone touches table ht. x3'    TB ext with march R/L x20 B green   LBW with shadi band fig 8 25 ft. x4                       Therapeutic Exercise and NMR EXR  [x] (22016) Provided verbal/tactile cueing for activities related to strengthening, flexibility, endurance, ROM for improvements in LE, proximal hip, and core control with self care, mobility, lifting, ambulation.  [] (85261) Provided verbal/tactile cueing for activities related to improving balance, coordination, kinesthetic sense, posture, motor skill, proprioception  to assist with LE, proximal hip, and core control in self care, mobility, lifting, ambulation and eccentric single leg control.      NMR and Therapeutic Activities:    [x] (89038 or 20354) Provided verbal/tactile cueing for activities related to improving balance, coordination, kinesthetic sense, posture, motor skill, proprioception and motor activation to allow for proper function of core, proximal hip and LE with self care and ADLs  [] (99461) Gait Re-education- Provided training and instruction to the patient for proper LE, core and proximal hip recruitment and positioning and eccentric body weight control with ambulation re-education including up and down stairs     Home Exercise Program:    [x] (24998) Reviewed/Progressed HEP activities related to strengthening, flexibility, endurance, ROM of core, proximal hip and LE for functional able to complete treatment  [] Patient limited by fatigue  [] Patient limited by pain    [] Patient limited by other medical complications  [] Other:     Assessment: progressing well, able to add functional strengthening ex. And avoid pain. Good ecc control with cues. Return to Play: (if applicable)   []  Stage 1: Intro to Strength   []  Stage 2: Return to Run and Strength   []  Stage 3: Return to Jump and Strength   []  Stage 4: Dynamic Strength and Agility   []  Stage 5: Sport Specific Training     []  Ready to Return to Play, Meets All Above Stages   []  Not Ready for Return to Sports   Comments:                               PLAN: See eval -progress with PPP, ecc control and quad strength  [x] Continue per plan of care [] Alter current plan (see comments above)  [] Plan of care initiated [] Hold pending MD visit [] Discharge      Electronically signed by:  Lara Callejas PT,MSPT, OMT-C [id number]    Note: If patient does not return for scheduled/ recommended follow up visits, this note will serve as a discharge from care along with most recent update on progress.

## 2020-03-06 ENCOUNTER — HOSPITAL ENCOUNTER (OUTPATIENT)
Dept: PHYSICAL THERAPY | Age: 55
Setting detail: THERAPIES SERIES
Discharge: HOME | End: 2020-03-06
Payer: COMMERCIAL

## 2020-03-06 PROCEDURE — 97110 THERAPEUTIC EXERCISES: CPT | Performed by: PHYSICAL THERAPIST

## 2020-03-06 PROCEDURE — 97112 NEUROMUSCULAR REEDUCATION: CPT | Performed by: PHYSICAL THERAPIST

## 2020-03-06 NOTE — FLOWSHEET NOTE
[facility],  [address]  Phone: [phone]  Fax [phone]    Physical Therapy Daily Treatment Note  Date:  3/6/2020    Patient Name:  Perfecto Gaitan    :  1965  MRN: [id number]  Restrictions/Precautions:  LT KNEE CMP/PPP PROTOCOL  Physician Information:  Referring Practitioner: Gerson Kaur MD  Medical/Treatment Diagnosis Information:  · Diagnosis: M22.42 (ICD-10-CM) - Chondromalacia patellae of left knee  Treatment Diagnosis: Left knee pain M25.562  ·   [x] Conservative / [] Surgical - DOS:  Therapy Diagnosis/Practice Pattern: A   Insurance/Certification information:     Plan of care signed: [] YES  [] NO  Number of Comorbidities:  []0     [x]1-2    []3+  Date of Patient follow up with Physician:     Is this a Progress Report:     []  Yes  [x]  No        If Yes:  Date Range for reporting period:  Beginning  Ending    Progress report will be due (10 Rx or 30 days whichever is less):        Recertification will be due (POC Duration  / 90 days whichever is less):  6 weeks     Latex Allergy:  [x]NO      []YES  Preferred Language for Healthcare:   [x]English       []other:    Visit # Insurance Allowable Reporting Period   3 ? Begin Date: 2020               End Date:      RECERT DUE BY: 6 weeks    SUBJECTIVE:  Pt. Reports that the knee is feeling good, continuing to take meloxicam.  Has bruising along outer thigh from rolling on Tuesday-very sore.     OBJECTIVE:    Observation:level crests and PSIS in sitting and standing   Palpation: tender at distal ITB     Test used Initial score Current Score   Pain Summary VAS 3-6/10 0   Functional questionnaire LEFS 18%    ROM flexion WNL     extension WNL    Strength quad Tone 3+/5 with LTP     ABD 5-     flexion 4/5         RESTRICTIONS/PRECAUTIONS:     Exercises/Interventions:     Therapeutic Ex Sets/reps Notes   Supine HS/lat HS  stretch :30x3 ea Supine ITB stretch     gastroc stretch incline :30x5    SLR flexion seated :5x10    SLR abd-circles cw,ccw B 2x10 ea B    Prone SLR ext 3x10    Pt ed anatomy, RICE, PT progression, findings, HEP               Hand heel rock HEP                                       Manual Intervention     Supine lumbo pelvic roll L  cavitation   Assessment of pelvis   Level landmarks and (-) FF   ITB stretch in SL :30x3    Tyler' stretch-small range due to PPP     ITB roller with stick Held due to bruising         NMR re-education     Gliders post and lat x25 ea B    Wall squat holds with SB :3x20    FSU 6\"/LSU x20 ea    Tandem bal with cone touches table ht. TB ext with march R/L x25 B green   LBW with shadi band fig 8 25 ft. x6         KDL 2x8 B             Therapeutic Exercise and NMR EXR  [x] (87141) Provided verbal/tactile cueing for activities related to strengthening, flexibility, endurance, ROM for improvements in LE, proximal hip, and core control with self care, mobility, lifting, ambulation.  [] (09837) Provided verbal/tactile cueing for activities related to improving balance, coordination, kinesthetic sense, posture, motor skill, proprioception  to assist with LE, proximal hip, and core control in self care, mobility, lifting, ambulation and eccentric single leg control.      NMR and Therapeutic Activities:    [x] (60716 or 79924) Provided verbal/tactile cueing for activities related to improving balance, coordination, kinesthetic sense, posture, motor skill, proprioception and motor activation to allow for proper function of core, proximal hip and LE with self care and ADLs  [] (29436) Gait Re-education- Provided training and instruction to the patient for proper LE, core and proximal hip recruitment and positioning and eccentric body weight control with ambulation re-education including up and down stairs     Home Exercise Program:    [x] (46940) Reviewed/Progressed HEP activities related to strengthening, flexibility, endurance, ROM of core, proximal hip and LE for functional self-care, mobility, lifting and ambulation/stair navigation   [] (94009)Reviewed/Progressed HEP activities related to improving balance, coordination, kinesthetic sense, posture, motor skill, proprioception of core, proximal hip and LE for self care, mobility, lifting, and ambulation/stair navigation      Manual Treatments:  PROM / STM / Oscillations-Mobs:  G-I, II, III, IV (PA's, Inf., Post.)  [x] (14468) Provided manual therapy to mobilize LE, proximal hip and/or LS spine soft tissue/joints for the purpose of modulating pain, promoting relaxation,  increasing ROM, reducing/eliminating soft tissue swelling/inflammation/restriction, improving soft tissue extensibility and allowing for proper ROM for normal function with self care, mobility, lifting and ambulation. Modalities:       [] GR/ESU 15 min    [] GR 15 min  [] ESU     [] CP    [] MHP    [] declined     Charges:  Timed Code Treatment Minutes: 38   Total Treatment Minutes: 38     BWC time in/time out:   (and requires time in and out for each CPT code)    [] EVAL (LOW) 455 [id number] (typically 20 minutes face-to-face)  [] EVAL (MOD) 87180 (typically 30 minutes face-to-face)  [] EVAL (HIGH) 38684 (typically 45 minutes face-to-face)  [] RE-EVAL     [x] NX(36108) x 2   [] IONTO  [x] NMR (97313) x   1  [] VASO  [] Manual (26974) x    [] Other:  [] TA x      [] Mech Traction (53773)  [] ES(attended) (92455)      [] ES (un) (39105):     GOALS:   Patient stated goal: reduce pian and return to exercise  [] Progressing: [] Met: [] Not Met: [] Adjusted    Therapist goals for Patient:   Short Term Goals: To be achieved in: 2 weeks  1. Independent in HEP and progression per patient tolerance, in order to prevent re-injury. [] Progressing: [] Met: [] Not Met: [] Adjusted  2.  Patient will have a decrease in pain to facilitate improvement in movement, function, and ADLs as indicated by Functional

## 2020-03-10 ENCOUNTER — HOSPITAL ENCOUNTER (OUTPATIENT)
Dept: PHYSICAL THERAPY | Age: 55
Setting detail: THERAPIES SERIES
Discharge: HOME | End: 2020-03-10
Payer: COMMERCIAL

## 2020-03-10 PROCEDURE — 97110 THERAPEUTIC EXERCISES: CPT | Performed by: PHYSICAL THERAPIST

## 2020-03-10 PROCEDURE — 97112 NEUROMUSCULAR REEDUCATION: CPT | Performed by: PHYSICAL THERAPIST

## 2020-03-10 PROCEDURE — 97140 MANUAL THERAPY 1/> REGIONS: CPT | Performed by: PHYSICAL THERAPIST

## 2020-03-10 NOTE — FLOWSHEET NOTE
Functional Deficits. [] Progressing: [] Met: [] Not Met: [] Adjusted      Long Term Goals: To be achieved in: 6 weeks  1. Disability index score of 9% or less for the LEFS  to assist with reaching prior level of function. [] Progressing: [] Met: [] Not Met: [] Adjusted  2. Patient will demonstrate increased AROM to WNL, good LS mobility, good hip ROM to allow for proper joint functioning as indicated by patients Functional Deficits. [] Progressing: [] Met: [] Not Met: [] Adjusted  3. Patient will demonstrate an increase in Strength to good proximal hip and core activation to allow for proper functional mobility as indicated by patients Functional Deficits. [] Progressing: [] Met: [] Not Met: [] Adjusted  4. Patient will return to exercise machines functional activities without increased symptoms or restriction. [] Progressing: [] Met: [] Not Met: [] Adjusted  5. Pt. To be able to return to walking, negotiating steps and perform functional squatting without pain(patient specific functional goal)    [] Progressing: [] Met: [] Not Met: [] Adjusted   Progression Towards Functional goals:  [] Patient is progressing as expected towards functional goals listed. [] Progression is slowed due to complexities listed. [] Progression has been slowed due to co-morbidities. [x] Plan just implemented, too soon to assess goals progression  [] Other:     Overall Progression Towards Functional goals/ Treatment Progress Update:  [] Patient is progressing as expected towards functional goals listed. [] Progression is slowed due to complexities/Impairments listed. [] Progression has been slowed due to co-morbidities.   [x] Plan just implemented, too soon to assess goals progression <30days   [] Goals require adjustment due to lack of progress  [] Patient is not progressing as expected and requires additional follow up with physician  [] Other    Prognosis for POC: [x] Good [] Fair  [] Poor      Patient requires continued skilled intervention: [x] Yes  [] No    Treatment/Activity Tolerance:  [x] Patient able to complete treatment  [] Patient limited by fatigue  [] Patient limited by pain    [] Patient limited by other medical complications  [] Other:     Assessment: Pt. With dec ecc control with step down lateral with 4\" box with inc hip add and valgus moment. Pt. Able to correct with cues. No pain with treatment, advanced program with weight machines and updated HEP. Return to Play: (if applicable)   []  Stage 1: Intro to Strength   []  Stage 2: Return to Run and Strength   []  Stage 3: Return to Jump and Strength   []  Stage 4: Dynamic Strength and Agility   []  Stage 5: Sport Specific Training     []  Ready to Return to Play, Meets All Above Stages   []  Not Ready for Return to Sports   Comments:                               PLAN: See eval -progress with PPP, ecc control and quad strength + weight machines   [x] Continue per plan of care [] Alter current plan (see comments above)  [] Plan of care initiated [] Hold pending MD visit [] Discharge      Electronically signed by:  Teddy Armijo PT,MSPT, OMT-C [id number]    Note: If patient does not return for scheduled/ recommended follow up visits, this note will serve as a discharge from care along with most recent update on progress.

## 2020-03-12 ENCOUNTER — HOSPITAL ENCOUNTER (OUTPATIENT)
Dept: PHYSICAL THERAPY | Age: 55
Setting detail: THERAPIES SERIES
Discharge: HOME | End: 2020-03-12
Payer: COMMERCIAL

## 2020-03-12 PROCEDURE — 97112 NEUROMUSCULAR REEDUCATION: CPT | Performed by: PHYSICAL THERAPIST

## 2020-03-12 PROCEDURE — 97110 THERAPEUTIC EXERCISES: CPT | Performed by: PHYSICAL THERAPIST

## 2020-03-12 PROCEDURE — 97140 MANUAL THERAPY 1/> REGIONS: CPT | Performed by: PHYSICAL THERAPIST

## 2020-03-12 NOTE — FLOWSHEET NOTE
[facility],  [address]  Phone: [phone]  Fax [phone]    Physical Therapy Daily Treatment Note  Date:  3/12/2020    Patient Name:  Dino Kessler    :  1965  MRN: [id number]  Restrictions/Precautions:  LT KNEE CMP/PPP PROTOCOL  Physician Information:  Referring Practitioner: Danielle Casarez MD  Medical/Treatment Diagnosis Information:  · Diagnosis: M22.42 (ICD-10-CM) - Chondromalacia patellae of left knee  Treatment Diagnosis: Left knee pain M25.562  ·   [x] Conservative / [] Surgical - DOS:  Therapy Diagnosis/Practice Pattern: A   Insurance/Certification information:     Plan of care signed: [] YES  [] NO  Number of Comorbidities:  []0     [x]1-2    []3+  Date of Patient follow up with Physician:     Is this a Progress Report:     []  Yes  [x]  No        If Yes:  Date Range for reporting period:  Beginning  Ending    Progress report will be due (10 Rx or 30 days whichever is less):        Recertification will be due (POC Duration  / 90 days whichever is less):  6 weeks     Latex Allergy:  [x]NO      []YES  Preferred Language for Healthcare:   [x]English       []other:    Visit # Insurance Allowable Reporting Period   5 ? Begin Date: 2020               End Date:      RECERT DUE BY: 6 weeks    SUBJECTIVE:  The knee is doing well.     OBJECTIVE:    Observation:level crests and PSIS in sitting and standing   Palpation: tender at distal ITB, lat HS     Test used Initial score Current Score   Pain Summary VAS 3-6/10 0   Functional questionnaire LEFS 18%    ROM flexion WNL     extension WNL    Strength quad Tone 3+/5 with LTP     ABD 5-     flexion 4/5         RESTRICTIONS/PRECAUTIONS:     Exercises/Interventions:     Therapeutic Ex Sets/reps Notes   Supine HS/lat HS  stretch :30x3 ea    Supine ITB stretch :30x3    gastroc stretch incline :30x5    SLR flexion seated :5x10 1#   SLR abd-circles cw,ccw B 3x10 ea B 1#   Prone SLR ext 3x10 1#   Pt ed anatomy, RICE, PT progression, findings, HEP     ECC LAQ 2x15 3#        Hand heel rock HEP              LP 3x10 100#   eccLP x20 100#   JAIR TKE :5x20 60#   JAIR abd 2x10 B 45#        Manual Intervention     IASTM L lat HS /ITB 8'    Supine lumbo pelvic roll L  cavitation   Assessment of pelvis   Level landmarks and (-) FF   ITB stretch in SL     Tyler' stretch-small range due to [facility]     ITB roller with stick Held due to bruising         NMR re-education     Gliders post and lat x25 ea B    Wall squat holds with SB     LSD dnd-time x20  4\"   Tandem bal with cone touches table ht. TB ext with march R/L  green   LBW with shadi band fig 8 50 ft. x6         KDL 3x8 B 3# db            Therapeutic Exercise and NMR EXR  [x] (10346) Provided verbal/tactile cueing for activities related to strengthening, flexibility, endurance, ROM for improvements in LE, proximal hip, and core control with self care, mobility, lifting, ambulation.  [] (11710) Provided verbal/tactile cueing for activities related to improving balance, coordination, kinesthetic sense, posture, motor skill, proprioception  to assist with LE, proximal hip, and core control in self care, mobility, lifting, ambulation and eccentric single leg control.      NMR and Therapeutic Activities:    [x] (46521 or 62247) Provided verbal/tactile cueing for activities related to improving balance, coordination, kinesthetic sense, posture, motor skill, proprioception and motor activation to allow for proper function of core, proximal hip and LE with self care and ADLs  [] (92079) Gait Re-education- Provided training and instruction to the patient for proper LE, core and proximal hip recruitment and positioning and eccentric body weight control with ambulation re-education including up and down stairs     Home Exercise Program:    [x] (51411) Reviewed/Progressed HEP activities related to strengthening, flexibility, Progressing: [] Met: [] Not Met: [] Adjusted      Long Term Goals: To be achieved in: 6 weeks  1. Disability index score of 9% or less for the LEFS  to assist with reaching prior level of function. [] Progressing: [] Met: [] Not Met: [] Adjusted  2. Patient will demonstrate increased AROM to WNL, good LS mobility, good hip ROM to allow for proper joint functioning as indicated by patients Functional Deficits. [] Progressing: [] Met: [] Not Met: [] Adjusted  3. Patient will demonstrate an increase in Strength to good proximal hip and core activation to allow for proper functional mobility as indicated by patients Functional Deficits. [] Progressing: [] Met: [] Not Met: [] Adjusted  4. Patient will return to exercise machines functional activities without increased symptoms or restriction. [] Progressing: [] Met: [] Not Met: [] Adjusted  5. Pt. To be able to return to walking, negotiating steps and perform functional squatting without pain(patient specific functional goal)    [] Progressing: [] Met: [] Not Met: [] Adjusted   Progression Towards Functional goals:  [x] Patient is progressing as expected towards functional goals listed. [] Progression is slowed due to complexities listed. [] Progression has been slowed due to co-morbidities. [] Plan just implemented, too soon to assess goals progression  [] Other:     Overall Progression Towards Functional goals/ Treatment Progress Update:  [] Patient is progressing as expected towards functional goals listed. [] Progression is slowed due to complexities/Impairments listed. [] Progression has been slowed due to co-morbidities.   [x] Plan just implemented, too soon to assess goals progression <30days   [] Goals require adjustment due to lack of progress  [] Patient is not progressing as expected and requires additional follow up with physician  [] Other    Prognosis for POC: [x] Good [] Fair  [] Poor      Patient requires continued skilled

## 2020-03-17 ENCOUNTER — HOSPITAL ENCOUNTER (OUTPATIENT)
Dept: PHYSICAL THERAPY | Age: 55
Setting detail: THERAPIES SERIES
Discharge: HOME | End: 2020-03-17
Payer: COMMERCIAL

## 2020-03-17 NOTE — FLOWSHEET NOTE
[facility], [address], [address] [place] [address]        Physical Therapy  Cancellation/No-show Note  Patient Name:  Oscar Cardoza  :  1965   Date:  3/17/2020  Cancelled visits to date: 0  No-shows to date: 1    For today's appointment patient:  []  Cancelled  []  Rescheduled appointment  [x]  No-show     Reason given by patient:  []  Patient ill  []  Conflicting appointment  []  No transportation    []  Conflict with work  [x]  No reason given  []  Other:     Comments:      Electronically signed by:  Pierce Tay, PT, [facility], T-C [id number]

## 2020-03-20 ENCOUNTER — HOSPITAL ENCOUNTER (OUTPATIENT)
Dept: PHYSICAL THERAPY | Age: 55
Setting detail: THERAPIES SERIES
Discharge: HOME | End: 2020-03-20
Payer: COMMERCIAL

## 2020-03-20 NOTE — FLOWSHEET NOTE
[facility], [address], [address] [place] [address]        Physical Therapy  Cancellation/No-show Note  Patient Name:  He Solo  :  1965   Date:  3/20/2020  Cancelled visits to date: 0  No-shows to date: 2    For today's appointment patient:  []  Cancelled  []  Rescheduled appointment  [x]  No-show     Reason given by patient:  []  Patient ill  []  Conflicting appointment  []  No transportation    []  Conflict with work  [x]  No reason given  []  Other:     Comments:      Electronically signed by:  Moses Baugh, PT, [facility], [facility] [id number]

## 2020-03-25 ENCOUNTER — TELEPHONE (OUTPATIENT)
Dept: ORTHOPEDIC SURGERY | Age: 55
End: 2020-03-25

## 2020-03-25 RX ORDER — MELOXICAM 15 MG/1
15 TABLET ORAL DAILY PRN
Qty: 90 TABLET | Refills: 0 | Status: SHIPPED | OUTPATIENT
Start: 2020-03-25 | End: 2020-10-21

## 2020-03-25 NOTE — TELEPHONE ENCOUNTER
Spoke to patient. Knee was doing much better while in physical therapy. She is currently doing home exercises with more mild reoccurrence of her pain symptoms. She has requested a refill on her meloxicam which was sent to her pharmacy. She will follow-up in office once the pandemic settles down.

## 2020-10-21 ENCOUNTER — OFFICE VISIT (OUTPATIENT)
Dept: ORTHOPEDIC SURGERY | Age: 55
End: 2020-10-21
Payer: COMMERCIAL

## 2020-10-21 VITALS — WEIGHT: 136 LBS | HEIGHT: 63 IN | BODY MASS INDEX: 24.1 KG/M2

## 2020-10-21 PROCEDURE — 20610 DRAIN/INJ JOINT/BURSA W/O US: CPT | Performed by: FAMILY MEDICINE

## 2020-10-21 PROCEDURE — 99214 OFFICE O/P EST MOD 30 MIN: CPT | Performed by: FAMILY MEDICINE

## 2020-10-21 RX ORDER — BUPIVACAINE HYDROCHLORIDE 2.5 MG/ML
4 INJECTION, SOLUTION INFILTRATION; PERINEURAL ONCE
Status: COMPLETED | OUTPATIENT
Start: 2020-10-21 | End: 2020-10-21

## 2020-10-21 RX ORDER — BETAMETHASONE SODIUM PHOSPHATE AND BETAMETHASONE ACETATE 3; 3 MG/ML; MG/ML
12 INJECTION, SUSPENSION INTRA-ARTICULAR; INTRALESIONAL; INTRAMUSCULAR; SOFT TISSUE ONCE
Status: COMPLETED | OUTPATIENT
Start: 2020-10-21 | End: 2020-10-21

## 2020-10-21 RX ORDER — MELOXICAM 15 MG/1
15 TABLET ORAL DAILY
Qty: 30 TABLET | Refills: 3 | Status: SHIPPED | OUTPATIENT
Start: 2020-10-21

## 2020-10-21 RX ORDER — LIDOCAINE HYDROCHLORIDE 10 MG/ML
3 INJECTION, SOLUTION INFILTRATION; PERINEURAL ONCE
Status: COMPLETED | OUTPATIENT
Start: 2020-10-21 | End: 2020-10-21

## 2020-10-21 RX ADMIN — BETAMETHASONE SODIUM PHOSPHATE AND BETAMETHASONE ACETATE 12 MG: 3; 3 INJECTION, SUSPENSION INTRA-ARTICULAR; INTRALESIONAL; INTRAMUSCULAR; SOFT TISSUE at 11:40

## 2020-10-21 RX ADMIN — BUPIVACAINE HYDROCHLORIDE 10 MG: 2.5 INJECTION, SOLUTION INFILTRATION; PERINEURAL at 11:41

## 2020-10-21 RX ADMIN — LIDOCAINE HYDROCHLORIDE 3 ML: 10 INJECTION, SOLUTION INFILTRATION; PERINEURAL at 11:41

## 2020-10-21 NOTE — PROGRESS NOTES
Chief Complaint    Shoulder Pain (OPNP LEFT SHOULDER)    Initial relation recurrent left shoulder pain with mild weakness    History of Present Illness: Ian Johansen is a 63 y.o. 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 Her symptoms began after she was picking her son move a heavy couch up a narrow flight of stairs as he was moving into his new apartment in [place]. This was on 2/2020. She does recall she was lifting she felt a twinge of pain to the lateral aspect of her shoulder. We had previously worked her up and saw her for her shoulder in spring 2018 and she did have a remote MRI performed 12/10/2014 where imaging did reveal a partial-thickness tear to the supraspinatus quite small at 4 x 6 mm and she responded well to conservative treatment. She has really not had a problem with her shoulder since that time. Within a couple of days of lifting the couch, she noticed difficulty with active shoulder elevation particular at the end stage range of motion in abduction and external rotation. She feels a pinching. She admits she has not been very good about performing her cuff exercises as her shoulder really has not bothered her for the past couple of years. She has not yet started back on her anti-inflammatories once again is not working out with a  with any consistency since the coronavirus outbreak. She at times does not have any pain at all but with abduction and external rotation and can be quite substantial at 6-7 out of 10. No active locking catching neck pain or radicular symptoms. She is being seen today for repeat evaluation with updated imaging.         Medical History  Patient's medications, motor sensory and vascular exam is intact. Gait: Fluid smooth gait. Reflexes:  Symmetrically preserved. Additional Comments:        Additional Examinations:  Contralateral Exam: Examination of the right shoulder reveals no atrophy or deformity. The skin is warm and dry. Range of motion is within normal limits. There is no focal tenderness with palpation. No AC joint tenderness. Negative Neer's and Rose-Nate exams. Strength is graded 5/5 throughout. Right Upper Extremity:  Examination of the right upper extremity does not show any tenderness, deformity or injury. Range of motion is unremarkable. There is no gross instability. There are no rashes, ulcerations or lesions. Strength and tone are normal.  Left Upper Extremity: Examination of the left upper extremity does not show any tenderness, deformity or injury. Range of motion is unremarkable. There is no gross instability. There are no rashes, ulcerations or lesions. Strength and tone are normal.         Diagnostic Test Findings:   Left shoulder true AP outlet and axillary films were obtained today and show slight downslope inferior acromion with mild degenerative changes over the a.c. joint. No soft tissue calcifications or evidence of subluxation. MRI left shoulder obtained remotely 12/10/2014 as listed above  Narrative         HISTORY: 34-year-old female with shoulder injury in September 2014. Assess for rotator cuff    impingement.         TECHNICAL FACTORS: Long- and short-axis fat- and water-weighted images were performed. 1.5T    [place].         FINDINGS: No evidence of macrofracture or contusion. No signs of recent shoulder displacement. Mild posterior sagging of the humeral head is evident. The glenoid cup is slightly flattened.         No displaced tear of the glenoid labrum. Biceps long head tendon is intact and in normal    position.  No glenohumeral joint arthropathy or effusion.      Acromioclavicular joint is within normal limits. No medial outlet encroachment. The acromion is     of normal configuration. No evidence of lateral outlet encroachment.         Thickening of the anterior capsule of the glenohumeral joint is consistent with dry capsulitis.         Undersurface tear of the supraspinatus anterior footprint measuring 6mm in AP dimension and 4mm     in mediolateral dimension. Infraspinatus, teres minor and subscapularis tendons are intact. No     rotator cuff muscle atrophy. No evidence of entrapment neuropathy.         CONCLUSION:    1. Partial thickness, undersurface tear of the anterior aspect of the supraspinatus footprint    measuring 6mm x 4mm in dimension. 2. Anterior capsular thickening of the glenohumeral joint consistent with dry capsulitis.         Thank you for the opportunity to provide your interpretation.         MD MARLON Duval/GRECIA/ricky    D: MARLON 12/09/2014 7:58 PM    T: RICKY 12/09/2014 8:17 PM             Assessment:  #1. Nearly 3 weeks status post unrehabilitated low-grade recurrent left shoulder rotator cuff strain with shoulder pain with previously documented partial thickness undersurface tear supraspinatus per MRI 12/13/2014 with shoulder weakness         Impression:    Encounter Diagnoses   Name Primary?  Acute pain of left shoulder Yes    Strain of left rotator cuff capsule, initial encounter     Shoulder impingement, left        Office Procedures:     Orders Placed This Encounter   Procedures    XR SHOULDER LEFT (MIN 2 VIEWS)     Standing Status:   Future     Number of Occurrences:   1     Standing Expiration Date:   10/21/2021    CO ARTHROCENTESIS ASPIR&/INJ MAJOR JT/BURSA W/O US       Treatment Plan:  Treatment options were discussed with Denise Lechuga today. We did review her plain film, previous MRI findings and exam findings today.   She did have a previously documented partial thickness undersurface tear of the supraspinatus noted and December 2014 and I'm not highly suspicious of an advancement or high-grade full-thickness tearing at this time. Indications for repeat imaging were discussed over she has had no specific treatment other than relative rest at this time. We discussed the possibility of updating her MRI scan however her symptoms are really more mild to moderate and she is not having rest pain. She is however having discomfort if she rolls onto her left shoulder at night and as such we did opt to perform a left shoulder steroid injection today using 2 cc of Celestone, 4 cc of Marcaine, 3 cc of Xylocaine. We will start her back on meloxicam 15 mg daily we did review her cuff rehabilitation program.  We will see her back in 3 to 4 weeks and consider imaging and/or formal therapy if she is failing to improve. She is not currently working out with her  given the coronavirus outbreak. She will contact us in interim with questions or concerns. This dictation was performed with a verbal recognition program (DRAGON) and it was checked for errors. It is possible that there are still dictated errors within this office note. If so, please bring any errors to my attention for an addendum. All efforts were made to ensure that this office note is accurate.

## 2020-11-30 ENCOUNTER — OFFICE VISIT (OUTPATIENT)
Dept: ORTHOPEDIC SURGERY | Age: 55
End: 2020-11-30
Payer: COMMERCIAL

## 2020-11-30 VITALS — WEIGHT: 136 LBS | HEIGHT: 63 IN | BODY MASS INDEX: 24.1 KG/M2

## 2020-11-30 PROCEDURE — 99214 OFFICE O/P EST MOD 30 MIN: CPT | Performed by: FAMILY MEDICINE

## 2020-11-30 RX ORDER — METHYLPREDNISOLONE 4 MG/1
TABLET ORAL
Qty: 21 KIT | Refills: 0 | Status: SHIPPED | OUTPATIENT
Start: 2020-11-30 | End: 2020-12-11

## 2020-11-30 NOTE — PATIENT INSTRUCTIONS
Stop meloxicam.    Take Medrol first for 6 days. This is a steroid pack. Flip the package over to the foil side and the directions will tell you to start with 6 pills the first day, 5 pills the second day, etc. Please do not take any other anti-inflammatories with the medrol dose césar as this can upset your stomach. If something else is needed, you may take extra strength tylenol.      Once you are finished with the medrol, then you may re-start or start your anti-inflammatory: MELOXICAM 1X/DAY

## 2020-11-30 NOTE — PROGRESS NOTES
Chief Complaint    Follow-up (CHECK LEFT SHOULDER)    Follow-up recurrent left shoulder pain with mild weakness with remotely documented partial-thickness rotator cuff tear and history of capsulitis    History of Present Illness: Erik Correa is a 63 y.o. 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 Her symptoms began after she was picking her son move a heavy couch up a narrow flight of stairs as he was moving into his new apartment in [place]. This was on 10/2/2020. She does recall she was lifting she felt a twinge of pain to the lateral aspect of her shoulder. We had previously worked her up and saw her for her shoulder in spring 2018 and she did have a remote MRI performed 12/10/2014 where imaging did reveal a partial-thickness tear to the supraspinatus quite small at 4 x 6 mm and she responded well to conservative treatment. She has really not had a problem with her shoulder since that time. Within a couple of days of lifting the couch, she noticed difficulty with active shoulder elevation particular at the end stage range of motion in abduction and external rotation. She feels a pinching. She admits she has not been very good about performing her cuff exercises as her shoulder really has not bothered her for the past couple of years. She has not yet started back on her anti-inflammatories once again is not working out with a  with any consistency since the coronavirus outbreak. She at times does not have any pain at all but with abduction and external rotation and can be quite substantial at 6-7 out of 10. No active locking catching neck pain or radicular symptoms.   She is being seen today for repeat evaluation with updated imaging. Jyoti Oseguera was originally evaluated for her left shoulder on 10/21/2020 was started on conservative treatment once again in the form of home-based exercises as well as anti-inflammatories. She presents back today stating that her symptoms really have not substantially improved over the last 5-1/2 weeks. At rest she thankfully does not have a great deal of pain but continues to have pain actively elevating her shoulder up to about a 7 out of 10 and does have discomfort at night. She does have more anterior and lateral discomfort over the greater tuberosity and while she does believe that her strength has improved she continues to have position related weakness. Denies neck pain radicular symptoms or true mechanical symptoms involving the shoulder. No crepitation or popping. Overall she has not improved since her last visit. Medical History  Patient's medications, allergies, past medical, surgical, social and family histories were reviewed and updated as appropriate. Review of Systems  Relevant review of systems reviewed on 10/21/2020 and available in the patient's chart under the medial tab. Vital Signs  There were no vitals filed for this visit. General Exam:   Constitutional: Patient is adequately groomed with no evidence of malnutrition  DTRs: Deep tendon reflexes are intact  Mental Status: The patient is oriented to time, place and person. The patient's mood and affect are appropriate. Lymphatic: The lymphatic examination bilaterally reveals all areas to be without enlargement or induration. Vascular: Examination reveals no swelling or calf tenderness. Peripheral pulses are palpable and 2+. Neurological: The patient has good coordination. There is no weakness or sensory deficit. Shoulder Examination    Inspection:  There is no high-grade deformity atrophy or effusion.       Palpation:  She does have mild tenderness over the greater tuberosity Findings:   Left shoulder true AP outlet and axillary films were reviewed from 10/21/2020 and show slight downslope inferior acromion with mild degenerative changes over the a.c. joint. No soft tissue calcifications or evidence of subluxation. MRI left shoulder obtained remotely 12/10/2014 as listed above  Narrative         HISTORY: 54-year-old female with shoulder injury in September 2014. Assess for rotator cuff    impingement.         TECHNICAL FACTORS: Long- and short-axis fat- and water-weighted images were performed. 1.5T    [place].         FINDINGS: No evidence of macrofracture or contusion. No signs of recent shoulder displacement. Mild posterior sagging of the humeral head is evident. The glenoid cup is slightly flattened.         No displaced tear of the glenoid labrum. Biceps long head tendon is intact and in normal    position. No glenohumeral joint arthropathy or effusion.         Acromioclavicular joint is within normal limits. No medial outlet encroachment. The acromion is     of normal configuration. No evidence of lateral outlet encroachment.         Thickening of the anterior capsule of the glenohumeral joint is consistent with dry capsulitis.         Undersurface tear of the supraspinatus anterior footprint measuring 6mm in AP dimension and 4mm     in mediolateral dimension. Infraspinatus, teres minor and subscapularis tendons are intact. No     rotator cuff muscle atrophy. No evidence of entrapment neuropathy.         CONCLUSION:    1. Partial thickness, undersurface tear of the anterior aspect of the supraspinatus footprint    measuring 6mm x 4mm in dimension. 2. Anterior capsular thickening of the glenohumeral joint consistent with dry capsulitis.         Thank you for the opportunity to provide your interpretation.         MD MARLON Dillon/GRECIA/edward    D: MARLON 12/09/2014 7:58 PM    T: EDWARD 12/09/2014 8:17 PM             Assessment:  #1.   Nearly 2 months status post persistent recurrent left shoulder rotator cuff strain with shoulder pain with previously documented partial thickness undersurface tear supraspinatus with shoulder capsulitis per MRI 12/13/2014 with shoulder weakness         Impression:    Encounter Diagnoses   Name Primary?  Strain of left rotator cuff capsule, initial encounter Yes    Acute pain of left shoulder     Shoulder impingement, left        Office Procedures:     Orders Placed This Encounter   Procedures    MRI SHOULDER LEFT WO CONTRAST     Standing Status:   Future     Standing Expiration Date:   2/28/2021     Scheduling Instructions:      [facility]      [address] [place], [place]            [address]:      TIME AND DATE TBD      PLEASE CALL PATIENT ONCE APPROVED TO SCHEDULE       PUSH TO [employer] PACS SYSTEM            Remember that it may take several business days to pre-cert your MRI through your insurance. Our office will contact you as soon as we have the approval. We will not give any test results over the phone. Please call [phone] once you have your test day and time to schedule a follow up with Dr. Elvis Fink. Order Specific Question:   Reason for exam:     Answer:   EVALUATE IMPINGEMENT, R/O ROTATOR CUFF TEAR/CAPSULITIS     Order Specific Question:   Reason for exam:     Answer:   COMPARE TO SCAN FROM DECEMBER 2014       Treatment Plan:  Treatment options were discussed with Martinez Sanchez today. We did review her plain film, previous MRI findings and exam findings today. She did have a previously documented partial thickness undersurface tear of the supraspinatus and capsulitis noted and December 2014 and I'm not highly suspicious of an advancement or high-grade full-thickness tearing at this time.   Clinically with treatment over the last 6 weeks, symptoms have not improved and continues to have sharp 7 out of 10 pain over greater tuberosity and also anterior pain over the

## 2020-12-01 ENCOUNTER — TELEPHONE (OUTPATIENT)
Dept: ORTHOPEDIC SURGERY | Age: 55
End: 2020-12-01

## 2020-12-01 NOTE — TELEPHONE ENCOUNTER
Left voicemail for patient that their MRI has been authorized and that they can call and schedule scan at their convenience. Also told them that they can call and schedule a f/u with Dr. Keshawn Johnston once they have MRI scheduled, leaving at least 2-3 days for our office to receive their results.

## 2020-12-11 ENCOUNTER — OFFICE VISIT (OUTPATIENT)
Dept: ORTHOPEDIC SURGERY | Age: 55
End: 2020-12-11
Payer: COMMERCIAL

## 2020-12-11 VITALS — HEIGHT: 63 IN | WEIGHT: 136 LBS | BODY MASS INDEX: 24.1 KG/M2

## 2020-12-11 PROCEDURE — 99213 OFFICE O/P EST LOW 20 MIN: CPT | Performed by: FAMILY MEDICINE

## 2020-12-11 PROCEDURE — 20610 DRAIN/INJ JOINT/BURSA W/O US: CPT | Performed by: FAMILY MEDICINE

## 2020-12-11 RX ORDER — BUPIVACAINE HYDROCHLORIDE 2.5 MG/ML
4 INJECTION, SOLUTION INFILTRATION; PERINEURAL ONCE
Status: COMPLETED | OUTPATIENT
Start: 2020-12-11 | End: 2020-12-11

## 2020-12-11 RX ORDER — LIDOCAINE HYDROCHLORIDE 10 MG/ML
3 INJECTION, SOLUTION INFILTRATION; PERINEURAL ONCE
Status: COMPLETED | OUTPATIENT
Start: 2020-12-11 | End: 2020-12-11

## 2020-12-11 RX ORDER — BETAMETHASONE SODIUM PHOSPHATE AND BETAMETHASONE ACETATE 3; 3 MG/ML; MG/ML
12 INJECTION, SUSPENSION INTRA-ARTICULAR; INTRALESIONAL; INTRAMUSCULAR; SOFT TISSUE ONCE
Status: COMPLETED | OUTPATIENT
Start: 2020-12-11 | End: 2020-12-11

## 2020-12-11 RX ADMIN — LIDOCAINE HYDROCHLORIDE 3 ML: 10 INJECTION, SOLUTION INFILTRATION; PERINEURAL at 11:59

## 2020-12-11 RX ADMIN — BUPIVACAINE HYDROCHLORIDE 10 MG: 2.5 INJECTION, SOLUTION INFILTRATION; PERINEURAL at 11:58

## 2020-12-11 RX ADMIN — BETAMETHASONE SODIUM PHOSPHATE AND BETAMETHASONE ACETATE 12 MG: 3; 3 INJECTION, SUSPENSION INTRA-ARTICULAR; INTRALESIONAL; INTRAMUSCULAR; SOFT TISSUE at 11:58

## 2020-12-11 NOTE — PROGRESS NOTES
Chief Complaint    Shoulder Pain (TR MRI LEFT SHOULDER)    Follow-up recurrent left shoulder pain with mild weakness with remotely documented partial-thickness rotator cuff tear and history of capsulitis. Review of imaging    History of Present Illness: Salo Saini is a 63 y.o. 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 Her symptoms began after she was picking her son move a heavy couch up a narrow flight of stairs as he was moving into his new apartment in [place]. This was on 10/2/2020. She does recall she was lifting she felt a twinge of pain to the lateral aspect of her shoulder. We had previously worked her up and saw her for her shoulder in spring 2018 and she did have a remote MRI performed 12/10/2014 where imaging did reveal a partial-thickness tear to the supraspinatus quite small at 4 x 6 mm and she responded well to conservative treatment. She has really not had a problem with her shoulder since that time. Within a couple of days of lifting the couch, she noticed difficulty with active shoulder elevation particular at the end stage range of motion in abduction and external rotation. She feels a pinching. She admits she has not been very good about performing her cuff exercises as her shoulder really has not bothered her for the past couple of years. She has not yet started back on her anti-inflammatories once again is not working out with a  with any consistency since the coronavirus outbreak. She at times does not have any pain at all but with abduction and external rotation and can be quite substantial at 6-7 out of 10. No active locking catching neck pain or radicular symptoms.   She is being seen today for repeat evaluation with updated imaging. Mala Hill was originally evaluated for her left shoulder on 10/21/2020 was started on conservative treatment once again in the form of home-based exercises as well as anti-inflammatories. She presents back today stating that her symptoms really have not substantially improved over the last 5-1/2 weeks. At rest she thankfully does not have a great deal of pain but continues to have pain actively elevating her shoulder up to about a 7 out of 10 and does have discomfort at night. She does have more anterior and lateral discomfort over the greater tuberosity and while she does believe that her strength has improved she continues to have position related weakness. Denies neck pain radicular symptoms or true mechanical symptoms involving the shoulder. No crepitation or popping. Overall she has not improved since her last visit. Mala Hill was last seen in the office on 11/30/2020 and given lack of improvement we did send her for an updated MRI of her left shoulder. This was performed at [facility] on 12/3/2020 and did show evidence of mild arthritic changes to the glenohumeral joint as well as AC joint with capsulitis. She did have more chronic mild to moderate capsulitis consistent with adhesive capsulitis with some mild tendinopathy of the supraspinatus and infraspinatus with peritendinobursitis. No evidence of high-grade labral tearing or full-thickness rotator cuff tearing. Clinically she is effectively unchanged and at rest has very little discomfort only about a 1-2 but active elevation is still 7 out of 10. She has been trying to perform her home-based exercise program and is continued with her meloxicam.      Medical History  Patient's medications, allergies, past medical, surgical, social and family histories were reviewed and updated as appropriate.       Review of Systems  Relevant review of systems reviewed on 10/21/2020 and available in the patient's chart under the medial tab. Vital Signs  There were no vitals filed for this visit. General Exam:   Constitutional: Patient is adequately groomed with no evidence of malnutrition  DTRs: Deep tendon reflexes are intact  Mental Status: The patient is oriented to time, place and person. The patient's mood and affect are appropriate. Lymphatic: The lymphatic examination bilaterally reveals all areas to be without enlargement or induration. Vascular: Examination reveals no swelling or calf tenderness. Peripheral pulses are palpable and 2+. Neurological: The patient has good coordination. There is no weakness or sensory deficit. Shoulder Examination    Inspection:  There is no high-grade deformity atrophy or effusion. Palpation:  She does have ongoing mild tenderness over the greater tuberosity minimally over the posterior cuff. Minimal tenderness over the a.c. joint. No bicipital tendon tenderness. Rang of Motion:  She does have continued pain associated reduction in motion. She reports continued pain with abduction at 105 and forward flexion beyond 125. She is stiff in the terminal 10 degrees of internal and external rotation. Strength:  She does have some mild weakness at 4 out of 5 with supra and infraspinatus testing which does reproduce her greater tuberosity discomfort with supraspinatus testing. .  5 out of 5 subscap testing. Special Tests:  Negative drop and liftoff testing. He does have about 5-6 out of 10 discomfort with impingement testing. She does have some pain reproduced with supraspinatus testing. Negative speed's and labral testing. No instability. Negative apprehension testing. Negative screening cervical testing. Skin: There are no rashes, ulcerations or lesions. Distal motor sensory and vascular exam is intact. Gait: Fluid smooth gait. Reflexes:  Symmetrically preserved.        Additional Comments:        Additional Examinations:  Contralateral Exam: Examination of the right shoulder reveals no atrophy or deformity. The skin is warm and dry. Range of motion is within normal limits. There is no focal tenderness with palpation. No AC joint tenderness. Negative Neer's and Rose-Nate exams. Strength is graded 5/5 throughout. Right Upper Extremity:  Examination of the right upper extremity does not show any tenderness, deformity or injury. Range of motion is unremarkable. There is no gross instability. There are no rashes, ulcerations or lesions. Strength and tone are normal.  Left Upper Extremity: Examination of the left upper extremity does not show any tenderness, deformity or injury. Range of motion is unremarkable. There is no gross instability. There are no rashes, ulcerations or lesions. Strength and tone are normal.         Diagnostic Test Findings:   Left shoulder true AP outlet and axillary films were reviewed from 10/21/2020 and show slight downslope inferior acromion with mild degenerative changes over the a.c. joint. No soft tissue calcifications or evidence of subluxation. MRI left shoulder obtained remotely 12/10/2014 as listed above  Narrative    Site: [facility] #: [id number] #: [id number] Procedure: MR Left Shoulder joint w/o Contrast ; Reason for Exam: STRAIN OF LEFT ROTATOR CUFF CAPSULE, INITIAL ENCOUNTER- PRIMARY. ACUTE PAIN OF LEFT SHOULDER. SHOULDER IMPINGEMENT, LEFT. This document is confidential medical information.  Unauthorized disclosure or use of this information is prohibited by law. If you are not the intended recipient of this document, please advise us by calling immediately [phone].         [facility]    [address]              Patient Name: César Patel    Case ID: [id number]    Patient : 1965    Referring Physician: Regulo Chang MD    Exam Date: 2020    Exam Description: MR Left Shoulder joint w/o Contrast              HISTORY:  Pain.         TECHNICAL FACTORS:  Long- and short-axis fat- and water-weighted images were performed.         COMPARISON:  None.         FINDINGS:  No acute fracture.  Decreased glenohumeral joint space with mild spurring.  Chronic    anterior labral tear superior to inferior, no paralabral cysts.  Mild-moderate glenohumeral    capsular thickening.  A miniscule amount of joint effusion.         Mild tendinopathy of supraspinatus and infraspinatus, no tear.  Subscapularis tendon intact.      Biceps long head tendon intact and in normal position.         Mild hypertrophy of AC joint with capsular inflammation.  Acromion type 1.  A miniscule amount    of fluid in the subacromial bursa.         No acute muscle strain.         CONCLUSION:    1. Mild glenohumeral arthrosis with decreased joint space and spurring.  Chronic mild-moderate    capsulitis with a minuscule effusion may represent a component of adhesive capsulitis. 2. Mild tendinopathy of supraspinatus and infraspinatus with peritendinitis, no tear. 3. Mild hypertrophic AC joint with mild capsulitis.         Thank you for the opportunity to provide your interpretation.                   George Brian M.D.         A: Tim 12/04/2020 12:44 PM    T: TSES 12/04/2020 12:36 PM             Assessment:  #1.   2+ months status post persistent recurrent left shoulder with mild underlying glenohumeral and AC joint arthropathy with mild cuff tendinopathy and adhesive capsulitis        Impression:    Encounter Diagnoses   Name Primary?     Tendinopathy of left rotator cuff Yes    Adhesive capsulitis of left shoulder     Localized osteoarthritis of left shoulder        Office Procedures:     Orders Placed This Encounter   Procedures    Ambulatory referral to Physical Therapy     Referral Priority:   Routine     Referral Type:   Eval and Treat     Referral Reason:   Specialty Services Required     Requested Specialty:   Physical Therapy Number of Visits Requested:   1    OK ARTHROCENTESIS ASPIR&/INJ MAJOR JT/BURSA W/O US       Treatment Plan:  Treatment options were discussed with Britt Holden today. We did review her plain film, her recent updated left shoulder MRI findings and exam findings today. Her MRI does show signs consistent with adhesive capsulitis was is likely her major pain generator although she does have some underlying glenohumeral joint and AC arthropathy with some mild cuff tendinopathy and tendinitis. After discussing options, we did perform a left shoulder glenohumeral injection today using 2 cc of Celestone, 4 cc of Marcaine, 3 cc of Xylocaine. We did have a detailed discussion regarding recovering from adhesive capsulitis and that it may take some time for this to get better. We will have her attend physical therapy formally and continue with the meloxicam.  The importance of performing her home exercise program was discussed as was activity modification and icing. She will contact us in the interim with questions or concerns we will see her back in 4 to 6 weeks. This dictation was performed with a verbal recognition program (DRAGON) and it was checked for errors. It is possible that there are still dictated errors within this office note. If so, please bring any errors to my attention for an addendum. All efforts were made to ensure that this office note is accurate.

## 2021-01-07 ENCOUNTER — HOSPITAL ENCOUNTER (OUTPATIENT)
Dept: PHYSICAL THERAPY | Age: 56
Setting detail: THERAPIES SERIES
Discharge: HOME | End: 2021-01-07
Payer: COMMERCIAL

## 2021-01-07 PROCEDURE — 97110 THERAPEUTIC EXERCISES: CPT | Performed by: PHYSICAL THERAPIST

## 2021-01-07 PROCEDURE — 97140 MANUAL THERAPY 1/> REGIONS: CPT | Performed by: PHYSICAL THERAPIST

## 2021-01-07 PROCEDURE — 97161 PT EVAL LOW COMPLEX 20 MIN: CPT | Performed by: PHYSICAL THERAPIST

## 2021-01-07 NOTE — PLAN OF CARE
[facility], [address] [place] [address]  Phone: [phone]  Fax [phone]     Physical Therapy Certification    Dear Referring Practitioner: Kelsi Kaur MD,    We had the pleasure of evaluating the following patient for physical therapy services at [address] [place] [address]. A summary of our findings can be found in the initial assessment below. This includes our plan of care. If you have any questions or concerns regarding these findings, please do not hesitate to contact me at the office phone number checked above.   Thank you for the referral.       Physician Signature:_______________________________Date:__________________  By signing above (or electronic signature), therapists plan is approved by physician    Patient: Slime Frazier   : 1965   MRN: [id number]  Referring Physician: Referring Practitioner: Kelsi Kaur MD      Evaluation Date: 2021      Medical Diagnosis Information:  Diagnosis: G04.219 (ICD-10-CM) - Tendinopathy of left rotator cuff,M75.02 (ICD-10-CM) - Adhesive capsulitis of left shoulder,M19.012 (ICD-10-CM) - Localized osteoarthritis of left shoulder    Treatment diagnosis:Left shoulder pain M25.512                                           Insurance information: PT Insurance Information: Ded: $5000/ Met $0OOP:$6000/Met $0Codes Billable: YESCopay:$0Coins: 10%TeleHealth:YESVisit Limit:100 (0 used)Auth Req:NORef #:[id number]    Precautions/ Contra-indications:   C-SSRS Triggered by Intake questionnaire (Past 2 wk assessment):   [x] No, Questionnaire did not trigger screening.   [] Yes, Patient intake triggered further evaluation      [] C-SSRS Screening completed  [] PCP notified via Plan of Care  [] Emergency services notified     Latex Allergy:  [x]NO      []YES  Preferred Language for Healthcare:   [x]English       []other: [x]Performed Review of systems (Integumentary, CardioPulmonary, Neurological) by intake and observation. Intake form has been scanned into medical record. Patient has been instructed to contact their primary care physician regarding ROS issues if not already being addressed at this time. Co-morbidities/Complexities (which will affect course of rehabilitation):   []None           Arthritic conditions   []Rheumatoid arthritis (M05.9)  []Osteoarthritis (M19.91)   Cardiovascular conditions   []Hypertension (I10)  []Hyperlipidemia (E78.5)  []Angina pectoris (I20)  []Atherosclerosis (I70)   Musculoskeletal conditions   []Disc pathology   []Congenital spine pathologies   []Prior surgical intervention  []Osteoporosis (M81.8)  []Osteopenia (M85.8)   Endocrine conditions   []Hypothyroid (E03.9)  []Hyperthyroid Gastrointestinal conditions   []Constipation (H07.90)   Metabolic conditions   []Morbid obesity (E66.01)  []Diabetes type 1(E10.65) or 2 (E11.65)   []Neuropathy (G60.9)     Pulmonary conditions   []Asthma (J45)  []Coughing   []COPD (J44.9)   Psychological Disorders  []Anxiety (F41.9)  []Depression (F32.9)   []Other:   [x]Other: prior h/o L shoulder pain         Barriers to/and or personal factors that will affect rehab potential:              []Age  []Sex              []Motivation/Lack of Motivation                        [x]Co-Morbidities              []Cognitive Function, education/learning barriers              []Environmental, home barriers              []profession/work barriers  []past PT/medical experience  []other:  Justification: see above     Falls Risk Assessment (30 days):   [x] Falls Risk assessed and no intervention required.   [] Falls Risk assessed and Patient requires intervention due to being higher risk   TUG score (>12s at risk):     [] Falls education provided, including       G-Codes:       ASSESSMENT:   Functional Impairments   [x]Noted spinal or UE joint hypomobility []Noted spinal or UE joint hypermobility   [x]Decreased UE functional ROM   [x]Decreased UE functional strength   []Abnormal reflexes/sensation/myotomal/dermatomal deficits   [x]Decreased RC/scapular/core strength and neuromuscular control   []other:      Functional Activity Limitations (from functional questionnaire and intake)   [x]Reduced ability to tolerate prolonged functional positions   []Reduced ability or difficulty with changes of positions or transfers between positions   []Reduced ability to maintain good posture and demonstrate good body mechanics with sitting, bending, and lifting   [] Reduced ability or tolerance with driving and/or computer work   [x]Reduced ability to sleep   []Reduced ability to perform lifting, reaching, carrying tasks   []Reduced ability to tolerate impact through UE   [x]Reduced ability to reach behind back   []Reduced ability to  or hold objects   []Reduced ability to throw or toss an object   []other:    Participation Restrictions   []Reduced participation in self care activities   []Reduced participation in home management activities   []Reduced participation in work activities   []Reduced participation in social activities. [x]Reduced participation in sport/recreation activities. Classification:   []Signs/symptoms consistent with post-surgical status including decreased ROM, strength and function.   []Signs/symptoms consistent with joint sprain/strain   [x]Signs/symptoms consistent with shoulder impingement   [x]Signs/symptoms consistent with shoulder/elbow/wrist tendinopathy   []Signs/symptoms consistent with Rotator cuff tear   []Signs/symptoms consistent with labral tear   []Signs/symptoms consistent with postural dysfunction    []Signs/symptoms consistent with Glenohumeral IR Deficit - <45 degrees   []Signs/symptoms consistent with facet dysfunction of cervical/thoracic spine    [x]Signs/symptoms consistent with pathology which may benefit from Dry needling [x]other:adhesive capsulitis     Prognosis/Rehab Potential:      []Excellent   [x]Good    []Fair   []Poor    Tolerance of evaluation/treatment:    []Excellent   [x]Good    []Fair   []Poor  Physical Therapy Evaluation Complexity Justification  [x] A history of present problem with:  [] no personal factors and/or comorbidities that impact the plan of care;  [x]1-2 personal factors and/or comorbidities that impact the plan of care  []3 personal factors and/or comorbidities that impact the plan of care  [x] An examination of body systems using standardized tests and measures addressing any of the following: body structures and functions (impairments), activity limitations, and/or participation restrictions;:  [] a total of 1-2 or more elements   [x] a total of 3 or more elements   [] a total of 4 or more elements   [x] A clinical presentation with:  [x] stable and/or uncomplicated characteristics   [] evolving clinical presentation with changing characteristics  [] unstable and unpredictable characteristics;   [x] Clinical decision making of [] low, [] moderate, [] high complexity using standardized patient assessment instrument and/or measurable assessment of functional outcome. [x] EVAL (LOW) 45192 (typically 20 minutes face-to-face)  [] EVAL (MOD) 75548 (typically 30 minutes face-to-face)  [] EVAL (HIGH) 62936 (typically 45 minutes face-to-face)  [] RE-EVAL       PLAN:  Frequency/Duration:  2days per week for 10 Weeks:  INTERVENTIONS:  [x] Therapeutic exercise including: strength training, ROM, for Upper extremity and core   [x]  NMR activation and proprioception for UE, scap and Core   [x] Manual therapy as indicated for shoulder, scapula and spine to include: Dry Needling/IASTM, STM, PROM, Gr I-IV mobilizations, manipulation.    [x] Modalities as needed that may include: thermal agents, E-stim, Biofeedback, US, iontophoresis as indicated [x] Patient education on joint protection, postural re-education, activity modification, progression of HEP. HEP instruction:Access Code: [email or web address]   URL: [email or web address]   Date: 01/07/2021   Prepared by: Vesta Modest     Exercises   Supine Chest Stretch with Elbows Bent - 3-4 reps - 1 sets - 60 hold - 2x daily - 7x weekly   Doorway Pec Stretch at 60 Elevation - 10 reps - 1 sets - 10 hold - 2x daily - 7x weekly   Doorway Pec Stretch at 90 Degrees Abduction - 10 reps - 1 sets - 10 hold - 2x daily - 7x weekly   Shoulder ER Stretch in Abduction - 10 reps - 1 sets - 5-10 hold - 2x daily - 7x weekly   Shoulder Extension with Resistance - 10 reps - 2-3 sets - 2x daily - 7x weekly       GOALS:  Patient stated goal: return to pain free use of L UE  [] Progressing: [] Met: [] Not Met: [] Adjusted    Therapist goals for Patient:   Short Term Goals: To be achieved in: 2 weeks  1. Independent in HEP and progression per patient tolerance, in order to prevent re-injury. [] Progressing: [] Met: [] Not Met: [] Adjusted  2. Patient will have a decrease in pain to facilitate improvement in movement, function, and ADLs as indicated by Functional Deficits. [] Progressing: [] Met: [] Not Met: [] Adjusted    Long Term Goals: To be achieved in: 10 weeks  1. Disability index score of 17% or less for the [facility] to assist with reaching prior level of function. [] Progressing: [] Met: [] Not Met: [] Adjusted  2. Patient will demonstrate increased AROM to 170 shoulder F/abd, ER T3, IR T8 to allow for proper joint functioning as indicated by patients Functional Deficits. [] Progressing: [] Met: [] Not Met: [] Adjusted  3. Patient will demonstrate an increase in Strength to 5/5 to allow for proper functional mobility as indicated by patients Functional Deficits.    [] Progressing: [] Met: [] Not Met: [] Adjusted 4. Patient will return to reaching and lifting functional activities without increased symptoms or restriction. [] Progressing: [] Met: [] Not Met: [] Adjusted  5. Pt.  To be able to don coat, doff bra without limitation(patient specific functional goal)    [] Progressing: [] Met: [] Not Met: [] Adjusted       Electronically signed by:  Sherrell Avila, PT, [facility], T-C [id number]

## 2021-01-07 NOTE — FLOWSHEET NOTE
[facility], [address]  Phone: [phone]  Fax [phone]        Date:  2021    Patient Name:  Billy Mansfield    :  1965  MRN: [id number]  Restrictions/Precautions:    Medical/Treatment Diagnosis Information:  · Diagnosis: M67.912 (ICD-10-CM) - Tendinopathy of left rotator cuff,M75.02 (ICD-10-CM) - Adhesive capsulitis of left shoulder,M19.012 (ICD-10-CM) - Localized osteoarthritis of left shoulder    Treatment diagnosis:Left shoulder pain M25.512          ·   Insurance/Certification information:  PT Insurance Information: Ded: $5000/ Met $0OOP:$6000/Met $0Codes Billable: YESCopay:$0Coins: 10%TeleHealth:YESVisit Limit:100 (0 used)Auth Req:NORef [id number]  Physician Information:  Referring Practitioner: Subha Ireland MD  Has the plan of care been signed (Y/N):        []  Yes  [x]  No     Date of Patient follow up with Physician:      Is this a Progress Report:     []  Yes  [x]  No        If Yes:  Date Range for reporting period:  Beginning 21  Ending    Progress report will be due (10 Rx or 30 days whichever is less): 60       Recertification will be due (POC Duration  / 90 days whichever is less): 10 weeks      Visit # Insurance Allowable Auth Required   In-person 1  []  Yes []  No    Telehealth   []  Yes []  No    Total            Functional Scale: Quick dash 26/55 34%    Date assessed:  21      Therapy Diagnosis/Practice Pattern:E      Number of Comorbidities:  []0     [x]1-2    []3+    Latex Allergy:  [x]NO      []YES  Preferred Language for Healthcare:   [x]English       []other:      Pain level:  0-8/10     SUBJECTIVE:  See eval    OBJECTIVE: See eval  ? Observation:   ? Test measurements:      RESTRICTIONS/PRECAUTIONS:     Exercises/Interventions:     Therapeutic Ex (83050) Sets/sec Reps Notes/CUES         Supine B shoulder ER  :60x3     Wall slide flexion x10 Multi angle door way stretch single :10x10                 TB ext B x20  green                     Manual Intervention (64939)      L GHJ mobs post/inf 6'           L 1st rib mobs 3'     subscap release 2'                 NMR re-education (22623)   CUES NEEDED                     Ed re findings, progression, modalities, HEP, activity mod. Therapeutic Activity (71221)                                                Therapeutic Exercise and NMR EXR  [x] (69719) Provided verbal/tactile cueing for activities related to strengthening, flexibility, endurance, ROM  for improvements in scapular, scapulothoracic and UE control with self care, reaching, carrying, lifting, house/yardwork, driving/computer work.    [] (84836) Provided verbal/tactile cueing for activities related to improving balance, coordination, kinesthetic sense, posture, motor skill, proprioception  to assist with  scapular, scapulothoracic and UE control with self care, reaching, carrying, lifting, house/yardwork, driving/computer work. Therapeutic Activities:    [] (53145 or 63283) Provided verbal/tactile cueing for activities related to improving balance, coordination, kinesthetic sense, posture, motor skill, proprioception and motor activation to allow for proper function of scapular, scapulothoracic and UE control with self care, carrying, lifting, driving/computer work.      Home Exercise Program:    [x] (30311) Reviewed/Progressed HEP activities related to strengthening, flexibility, endurance, ROM of scapular, scapulothoracic and UE control with self care, reaching, carrying, lifting, house/yardwork, driving/computer work  [] (24941) Reviewed/Progressed HEP activities related to improving balance, coordination, kinesthetic sense, posture, motor skill, proprioception of scapular, scapulothoracic and UE control with self care, reaching, carrying, lifting, house/yardwork, driving/computer work Manual Treatments:  PROM / STM / Oscillations-Mobs:  G-I, II, III, IV (PA's, Inf., Post.)  [x] (94402) Provided manual therapy to mobilize soft tissue/joints of cervical/CT, scapular GHJ and UE for the purpose of modulating pain, promoting relaxation,  increasing ROM, reducing/eliminating soft tissue swelling/inflammation/restriction, improving soft tissue extensibility and allowing for proper ROM for normal function with self care, reaching, carrying, lifting, house/yardwork, driving/computer work    Modalities:      Charges:  Timed Code Treatment Minutes: 25   Total Treatment Minutes: 45     BWC time in/time out:     [x] EVAL (LOW) 30119   [] EVAL (MOD) 41886   [] EVAL (HIGH) 30735   [] RE-EVAL     [x] SZ(48563) x 1    [] IONTO  [] NMR (15482) x     [] VASO  [x] Manual (38454) x  1    [] Other:  [] TA x      [] Mech Traction (22499)  [] ES(attended) (86947)      [] ES (un) (32645):     Jeremiah Castro stated goal: return to pain free use of L UE  []? Progressing: []? Met: []? Not Met: []? Adjusted     Therapist goals for Patient:   Short Term Goals: To be achieved in: 2 weeks  1. Independent in HEP and progression per patient tolerance, in order to prevent re-injury. []? Progressing: []? Met: []? Not Met: []? Adjusted  2. Patient will have a decrease in pain to facilitate improvement in movement, function, and ADLs as indicated by Functional Deficits. []? Progressing: []? Met: []? Not Met: []? Adjusted     Long Term Goals: To be achieved in: 10 weeks  1. Disability index score of 17% or less for the [facility] to assist with reaching prior level of function. []? Progressing: []? Met: []? Not Met: []? Adjusted  2. Patient will demonstrate increased AROM to 170 shoulder F/abd, ER T3, IR T8 to allow for proper joint functioning as indicated by patients Functional Deficits. []? Progressing: []? Met: []? Not Met: []?  Adjusted 3. Patient will demonstrate an increase in Strength to 5/5 to allow for proper functional mobility as indicated by patients Functional Deficits. []? Progressing: []? Met: []? Not Met: []? Adjusted  4. Patient will return to reaching and lifting functional activities without increased symptoms or restriction. []? Progressing: []? Met: []? Not Met: []? Adjusted  5. Pt. To be able to don coat, doff bra without limitation(patient specific functional goal)    []? Progressing: []? Met: []? Not Met: []? Adjusted          Progression Towards Functional goals:  [] Patient is progressing as expected towards functional goals listed. [] Progression is slowed due to complexities listed. [] Progression has been slowed due to co-morbidities. [x] Plan just implemented, too soon to assess goals progression  [] Other:     ASSESSMENT:  See eval    Overall Progression Towards Functional goals/ Treatment Progress Update:  [] Patient is progressing as expected towards functional goals listed. [] Progression is slowed due to complexities/Impairments listed. [] Progression has been slowed due to co-morbidities.   [x] Plan just implemented, too soon to assess goals progression <30days   [] Goals require adjustment due to lack of progress  [] Patient is not progressing as expected and requires additional follow up with physician  [] Other    Prognosis for POC: [x] Good [] Fair  [] Poor      Patient requires continued skilled intervention: [x] Yes  [] No    Treatment/Activity Tolerance:  [x] Patient able to complete treatment  [] Patient limited by fatigue  [] Patient limited by pain    [] Patient limited by other medical complications  [] Other:         Return to Play: (if applicable)   []  Stage 1: Intro to Strength   []  Stage 2: Return to Run and Strength   []  Stage 3: Return to Jump and Strength   []  Stage 4: Dynamic Strength and Agility   []  Stage 5: Sport Specific Training []  Ready to Return to Play, Meets All Above Stages   []  Not Ready for Return to Sports   Comments:                               PLAN: See eval  [] Continue per plan of care [] Alter current plan (see comments above)  [x] Plan of care initiated [] Hold pending MD visit [] Discharge      Electronically signed by:  Angle Simpson PT, MSPT, OMT-C [id number]    Note: If patient does not return for scheduled/ recommended follow up visits, this note will serve as a discharge from care along with most recent update on progress.

## 2021-01-15 ENCOUNTER — HOSPITAL ENCOUNTER (OUTPATIENT)
Dept: PHYSICAL THERAPY | Age: 56
Setting detail: THERAPIES SERIES
Discharge: HOME | End: 2021-01-15
Payer: COMMERCIAL

## 2021-01-15 PROCEDURE — 97112 NEUROMUSCULAR REEDUCATION: CPT | Performed by: PHYSICAL THERAPIST

## 2021-01-15 PROCEDURE — 97110 THERAPEUTIC EXERCISES: CPT | Performed by: PHYSICAL THERAPIST

## 2021-01-15 PROCEDURE — 97140 MANUAL THERAPY 1/> REGIONS: CPT | Performed by: PHYSICAL THERAPIST

## 2021-01-15 NOTE — FLOWSHEET NOTE
[facility], [address] [place]  Phone: [phone]  Fax [phone]        Date:  1/15/2021    Patient Name:  Leida Del Cid    :  1965  MRN: [id number]  Restrictions/Precautions:    Medical/Treatment Diagnosis Information:  · Diagnosis: M67.912 (ICD-10-CM) - Tendinopathy of left rotator cuff,M75.02 (ICD-10-CM) - Adhesive capsulitis of left shoulder,M19.012 (ICD-10-CM) - Localized osteoarthritis of left shoulder    Treatment diagnosis:Left shoulder pain M25.512          ·   Insurance/Certification information:  PT Insurance Information: Ded: $5000/ Met $0OOP:$6000/Met $0Codes Billable: YESCopay:$0Coins: 10%TeleHealth:YESVisit Limit:100 (0 used)Auth Req:NORef [id number]  Physician Information:  Referring Practitioner: Serina Aguilera MD  Has the plan of care been signed (Y/N):        []  Yes  [x]  No     Date of Patient follow up with Physician:      Is this a Progress Report:     []  Yes  [x]  No        If Yes:  Date Range for reporting period:  Beginning 21  Ending    Progress report will be due (10 Rx or 30 days whichever is less):        Recertification will be due (POC Duration  / 90 days whichever is less): 10 weeks      Visit # Insurance Allowable Auth Required   In-person 2  []  Yes []  No    Telehealth   []  Yes []  No    Total            Functional Scale: Quick dash 26/55 34%    Date assessed:  21      Therapy Diagnosis/Practice Pattern:E      Number of Comorbidities:  []0     [x]1-2    []3+    Latex Allergy:  [x]NO      []YES  Preferred Language for Healthcare:   [x]English       []other:      Pain level:  0-8/10     SUBJECTIVE:  Doing well with new ex. No worse, still get pain with moving the wrong way.     OBJECTIVE:    Observation:L 1st rib elevation, TTP   Test measurements:  Limited with ER and IR    RESTRICTIONS/PRECAUTIONS:     Exercises/Interventions:     Therapeutic Ex (70942) Sets/sec Reps Notes/CUES         Supine B shoulder ER  :60x3     Wall slide flexion :10x5                 Multi angle door way stretch single dnd:10x10     SA punches 1 15 2#-mod cueing for form         TB ext B x20  green   Supine RS at 90 small range F/E, hoz ab/add 2 10 ea 2#   No money 2 10 Red band         Manual Intervention (78135)      L GHJ mobs post/inf 8'     PROM all plane 8'     L 1st rib mobs 3'     subscap release                  NMR re-education (95921)   CUES NEEDED                     Ed re findings, progression, modalities, HEP, activity mod. Therapeutic Activity (00012)                                                Therapeutic Exercise and NMR EXR  [x] (03105) Provided verbal/tactile cueing for activities related to strengthening, flexibility, endurance, ROM  for improvements in scapular, scapulothoracic and UE control with self care, reaching, carrying, lifting, house/yardwork, driving/computer work.    [] (42295) Provided verbal/tactile cueing for activities related to improving balance, coordination, kinesthetic sense, posture, motor skill, proprioception  to assist with  scapular, scapulothoracic and UE control with self care, reaching, carrying, lifting, house/yardwork, driving/computer work. Therapeutic Activities:    [] (36252 or 44735) Provided verbal/tactile cueing for activities related to improving balance, coordination, kinesthetic sense, posture, motor skill, proprioception and motor activation to allow for proper function of scapular, scapulothoracic and UE control with self care, carrying, lifting, driving/computer work.      Home Exercise Program:    [x] (46139) Reviewed/Progressed HEP activities related to strengthening, flexibility, endurance, ROM of scapular, scapulothoracic and UE control with self care, reaching, carrying, lifting, house/yardwork, driving/computer work  [] (07406) Reviewed/Progressed HEP activities related to improving balance, coordination, kinesthetic sense, posture, motor skill, proprioception of scapular, scapulothoracic and UE control with self care, reaching, carrying, lifting, house/yardwork, driving/computer work      Manual Treatments:  PROM / STM / Oscillations-Mobs:  G-I, II, III, IV (PA's, Inf., Post.)  [x] (81400) Provided manual therapy to mobilize soft tissue/joints of cervical/CT, scapular GHJ and UE for the purpose of modulating pain, promoting relaxation,  increasing ROM, reducing/eliminating soft tissue swelling/inflammation/restriction, improving soft tissue extensibility and allowing for proper ROM for normal function with self care, reaching, carrying, lifting, house/yardwork, driving/computer work    Modalities:      Charges:  Timed Code Treatment Minutes: 38   Total Treatment Minutes: 38     BWC time in/time out:     [] EVAL (LOW) 29278   [] EVAL (MOD) 42062   [] EVAL (HIGH) 25240   [] RE-EVAL     [x] EM(86799) x 1    [] IONTO  [x] NMR (28251) x 1    [] VASO  [x] Manual (96140) x  1    [] Other:  [] TA x      [] Mech Traction (92464)  [] ES(attended) (74642)      [] ES (un) (08395):     Camron Jiménez stated goal: return to pain free use of L UE  []? Progressing: []? Met: []? Not Met: []? Adjusted     Therapist goals for Patient:   Short Term Goals: To be achieved in: 2 weeks  1. Independent in HEP and progression per patient tolerance, in order to prevent re-injury. []? Progressing: []? Met: []? Not Met: []? Adjusted  2. Patient will have a decrease in pain to facilitate improvement in movement, function, and ADLs as indicated by Functional Deficits. []? Progressing: []? Met: []? Not Met: []? Adjusted     Long Term Goals: To be achieved in: 10 weeks  1. Disability index score of 17% or less for the [facility] to assist with reaching prior level of function. []? Progressing: []? Met: []? Not Met: []? Adjusted  2.  Patient will demonstrate increased AROM to 170 shoulder F/abd, ER T3, IR T8 to allow for proper joint functioning as indicated by patients Functional Deficits. []? Progressing: []? Met: []? Not Met: []? Adjusted  3. Patient will demonstrate an increase in Strength to 5/5 to allow for proper functional mobility as indicated by patients Functional Deficits. []? Progressing: []? Met: []? Not Met: []? Adjusted  4. Patient will return to reaching and lifting functional activities without increased symptoms or restriction. []? Progressing: []? Met: []? Not Met: []? Adjusted  5. Pt. To be able to don coat, doff bra without limitation(patient specific functional goal)    []? Progressing: []? Met: []? Not Met: []? Adjusted          Progression Towards Functional goals:  [] Patient is progressing as expected towards functional goals listed. [] Progression is slowed due to complexities listed. [] Progression has been slowed due to co-morbidities. [x] Plan just implemented, too soon to assess goals progression  [] Other:     ASSESSMENT:  Pt. Deangelo. Initial visit well, most limited with L shoulder ER/IR, persistent L 1st rib elevation. Able to progress with scap ex. And strength without limitation other than fatigue. Overall Progression Towards Functional goals/ Treatment Progress Update:  [] Patient is progressing as expected towards functional goals listed. [] Progression is slowed due to complexities/Impairments listed. [] Progression has been slowed due to co-morbidities.   [x] Plan just implemented, too soon to assess goals progression <30days   [] Goals require adjustment due to lack of progress  [] Patient is not progressing as expected and requires additional follow up with physician  [] Other    Prognosis for POC: [x] Good [] Fair  [] Poor      Patient requires continued skilled intervention: [x] Yes  [] No    Treatment/Activity Tolerance:  [x] Patient able to complete treatment  [] Patient limited by fatigue  [] Patient limited by pain    [] Patient limited by other medical complications  [] Other:         Return to Play: (if applicable)   []  Stage 1: Intro to Strength   []  Stage 2: Return to Run and Strength   []  Stage 3: Return to Jump and Strength   []  Stage 4: Dynamic Strength and Agility   []  Stage 5: Sport Specific Training     []  Ready to Return to Play, Meets All Above Stages   []  Not Ready for Return to Sports   Comments:                               PLAN: Cont. 2x/week  [x] Continue per plan of care [] Alter current plan (see comments above)  [] Plan of care initiated [] Hold pending MD visit [] Discharge      Electronically signed by:  Eric Yeung, PT, MSPT, OMT-C [id number]    Note: If patient does not return for scheduled/ recommended follow up visits, this note will serve as a discharge from care along with most recent update on progress.

## 2021-01-19 ENCOUNTER — HOSPITAL ENCOUNTER (OUTPATIENT)
Dept: PHYSICAL THERAPY | Age: 56
Setting detail: THERAPIES SERIES
Discharge: HOME | End: 2021-01-19
Payer: COMMERCIAL

## 2021-01-19 NOTE — FLOWSHEET NOTE
[facility], [address], [address] [place] [address]        Physical Therapy  Cancellation/No-show Note  Patient Name:  Monik Bazzi  :  1965   Date:  2021  Cancelled visits to date: 1  No-shows to date: 0    For today's appointment patient:  [x]  Cancelled  []  Rescheduled appointment  []  No-show     Reason given by patient:  []  Patient ill  []  Conflicting appointment  []  No transportation    []  Conflict with work  []  No reason given  [x]  Other:  Has to go  son from school and take him for covid testing   Comments:      Electronically signed by:  Sacha Yee PT, [id number], OMT-C [id number]